# Patient Record
Sex: FEMALE | Race: WHITE | Employment: UNEMPLOYED | URBAN - METROPOLITAN AREA
[De-identification: names, ages, dates, MRNs, and addresses within clinical notes are randomized per-mention and may not be internally consistent; named-entity substitution may affect disease eponyms.]

---

## 2018-08-23 RX ORDER — LISINOPRIL AND HYDROCHLOROTHIAZIDE 12.5; 2 MG/1; MG/1
1 TABLET ORAL DAILY
COMMUNITY

## 2018-08-29 ENCOUNTER — ANESTHESIA (OUTPATIENT)
Dept: SURGERY | Facility: CLINIC | Age: 48
End: 2018-08-29
Payer: COMMERCIAL

## 2018-08-29 ENCOUNTER — HOSPITAL ENCOUNTER (INPATIENT)
Facility: CLINIC | Age: 48
LOS: 2 days | Discharge: HOME | End: 2018-08-31
Attending: ORTHOPAEDIC SURGERY | Admitting: ORTHOPAEDIC SURGERY
Payer: COMMERCIAL

## 2018-08-29 ENCOUNTER — ANESTHESIA EVENT (OUTPATIENT)
Dept: SURGERY | Facility: CLINIC | Age: 48
End: 2018-08-29
Payer: COMMERCIAL

## 2018-08-29 DIAGNOSIS — M86.472 CHRONIC OSTEOMYELITIS OF LEFT FOOT WITH DRAINING SINUS (H): Primary | ICD-10-CM

## 2018-08-29 PROBLEM — M86.9 OSTEOMYELITIS OF LEFT FOOT (H): Status: ACTIVE | Noted: 2018-08-29

## 2018-08-29 LAB
BASOPHILS # BLD AUTO: 0 10E9/L (ref 0–0.2)
BASOPHILS NFR BLD AUTO: 0.3 %
CREAT SERPL-MCNC: 0.51 MG/DL (ref 0.52–1.04)
DIFFERENTIAL METHOD BLD: ABNORMAL
EOSINOPHIL # BLD AUTO: 0 10E9/L (ref 0–0.7)
EOSINOPHIL NFR BLD AUTO: 0.1 %
ERYTHROCYTE [DISTWIDTH] IN BLOOD BY AUTOMATED COUNT: 14 % (ref 10–15)
GFR SERPL CREATININE-BSD FRML MDRD: >90 ML/MIN/1.7M2
GRAM STN SPEC: ABNORMAL
GRAM STN SPEC: NORMAL
GRAM STN SPEC: NORMAL
HCT VFR BLD AUTO: 41.3 % (ref 35–47)
HGB BLD-MCNC: 13.9 G/DL (ref 11.7–15.7)
IMM GRANULOCYTES # BLD: 0 10E9/L (ref 0–0.4)
IMM GRANULOCYTES NFR BLD: 0.4 %
INTERPRETATION ECG - MUSE: NORMAL
LYMPHOCYTES # BLD AUTO: 1 10E9/L (ref 0.8–5.3)
LYMPHOCYTES NFR BLD AUTO: 10 %
Lab: NORMAL
MCH RBC QN AUTO: 30.7 PG (ref 26.5–33)
MCHC RBC AUTO-ENTMCNC: 33.7 G/DL (ref 31.5–36.5)
MCV RBC AUTO: 91 FL (ref 78–100)
MONOCYTES # BLD AUTO: 0.1 10E9/L (ref 0–1.3)
MONOCYTES NFR BLD AUTO: 1 %
NEUTROPHILS # BLD AUTO: 8.6 10E9/L (ref 1.6–8.3)
NEUTROPHILS NFR BLD AUTO: 88.2 %
NRBC # BLD AUTO: 0 10*3/UL
NRBC BLD AUTO-RTO: 0 /100
PLATELET # BLD AUTO: 295 10E9/L (ref 150–450)
RBC # BLD AUTO: 4.53 10E12/L (ref 3.8–5.2)
SPECIMEN SOURCE: ABNORMAL
SPECIMEN SOURCE: NORMAL
WBC # BLD AUTO: 9.8 10E9/L (ref 4–11)

## 2018-08-29 PROCEDURE — 99222 1ST HOSP IP/OBS MODERATE 55: CPT | Performed by: PHYSICIAN ASSISTANT

## 2018-08-29 PROCEDURE — 12000007 ZZH R&B INTERMEDIATE

## 2018-08-29 PROCEDURE — 87070 CULTURE OTHR SPECIMN AEROBIC: CPT | Performed by: ORTHOPAEDIC SURGERY

## 2018-08-29 PROCEDURE — 25000132 ZZH RX MED GY IP 250 OP 250 PS 637: Performed by: ANESTHESIOLOGY

## 2018-08-29 PROCEDURE — 27210794 ZZH OR GENERAL SUPPLY STERILE: Performed by: ORTHOPAEDIC SURGERY

## 2018-08-29 PROCEDURE — 99207 ZZC CONSULT E&M CHANGED TO INITIAL LEVEL: CPT | Performed by: PHYSICIAN ASSISTANT

## 2018-08-29 PROCEDURE — 87077 CULTURE AEROBIC IDENTIFY: CPT | Performed by: ORTHOPAEDIC SURGERY

## 2018-08-29 PROCEDURE — 0JDR0ZZ EXTRACTION OF LEFT FOOT SUBCUTANEOUS TISSUE AND FASCIA, OPEN APPROACH: ICD-10-PCS | Performed by: ORTHOPAEDIC SURGERY

## 2018-08-29 PROCEDURE — 25000125 ZZHC RX 250: Performed by: NURSE ANESTHETIST, CERTIFIED REGISTERED

## 2018-08-29 PROCEDURE — 25000128 H RX IP 250 OP 636: Performed by: ANESTHESIOLOGY

## 2018-08-29 PROCEDURE — 87075 CULTR BACTERIA EXCEPT BLOOD: CPT | Performed by: ORTHOPAEDIC SURGERY

## 2018-08-29 PROCEDURE — 93010 ELECTROCARDIOGRAM REPORT: CPT | Performed by: INTERNAL MEDICINE

## 2018-08-29 PROCEDURE — 25000132 ZZH RX MED GY IP 250 OP 250 PS 637: Performed by: PHYSICIAN ASSISTANT

## 2018-08-29 PROCEDURE — 36000050 ZZH SURGERY LEVEL 2 1ST 30 MIN: Performed by: ORTHOPAEDIC SURGERY

## 2018-08-29 PROCEDURE — 25000128 H RX IP 250 OP 636: Performed by: NURSE ANESTHETIST, CERTIFIED REGISTERED

## 2018-08-29 PROCEDURE — 40000171 ZZH STATISTIC PRE-PROCEDURE ASSESSMENT III: Performed by: ORTHOPAEDIC SURGERY

## 2018-08-29 PROCEDURE — 87076 CULTURE ANAEROBE IDENT EACH: CPT | Performed by: ORTHOPAEDIC SURGERY

## 2018-08-29 PROCEDURE — 71000012 ZZH RECOVERY PHASE 1 LEVEL 1 FIRST HR: Performed by: ORTHOPAEDIC SURGERY

## 2018-08-29 PROCEDURE — 36000052 ZZH SURGERY LEVEL 2 EA 15 ADDTL MIN: Performed by: ORTHOPAEDIC SURGERY

## 2018-08-29 PROCEDURE — 87176 TISSUE HOMOGENIZATION CULTR: CPT | Performed by: ORTHOPAEDIC SURGERY

## 2018-08-29 PROCEDURE — 0QPP04Z REMOVAL OF INTERNAL FIXATION DEVICE FROM LEFT METATARSAL, OPEN APPROACH: ICD-10-PCS | Performed by: ORTHOPAEDIC SURGERY

## 2018-08-29 PROCEDURE — 82565 ASSAY OF CREATININE: CPT | Performed by: ORTHOPAEDIC SURGERY

## 2018-08-29 PROCEDURE — 25000566 ZZH SEVOFLURANE, EA 15 MIN: Performed by: ORTHOPAEDIC SURGERY

## 2018-08-29 PROCEDURE — 87205 SMEAR GRAM STAIN: CPT | Performed by: ORTHOPAEDIC SURGERY

## 2018-08-29 PROCEDURE — C1763 CONN TISS, NON-HUMAN: HCPCS | Performed by: ORTHOPAEDIC SURGERY

## 2018-08-29 PROCEDURE — 36415 COLL VENOUS BLD VENIPUNCTURE: CPT | Performed by: ORTHOPAEDIC SURGERY

## 2018-08-29 PROCEDURE — 25000125 ZZHC RX 250: Performed by: ANESTHESIOLOGY

## 2018-08-29 PROCEDURE — 37000009 ZZH ANESTHESIA TECHNICAL FEE, EACH ADDTL 15 MIN: Performed by: ORTHOPAEDIC SURGERY

## 2018-08-29 PROCEDURE — 3E0V329 INTRODUCTION OF OTHER ANTI-INFECTIVE INTO BONES, PERCUTANEOUS APPROACH: ICD-10-PCS | Performed by: ORTHOPAEDIC SURGERY

## 2018-08-29 PROCEDURE — 0SBN0ZZ EXCISION OF LEFT METATARSAL-PHALANGEAL JOINT, OPEN APPROACH: ICD-10-PCS | Performed by: ORTHOPAEDIC SURGERY

## 2018-08-29 PROCEDURE — 27211024 ZZHC OR SUPPLY OTHER OPNP: Performed by: ORTHOPAEDIC SURGERY

## 2018-08-29 PROCEDURE — 37000008 ZZH ANESTHESIA TECHNICAL FEE, 1ST 30 MIN: Performed by: ORTHOPAEDIC SURGERY

## 2018-08-29 PROCEDURE — 25000128 H RX IP 250 OP 636: Performed by: ORTHOPAEDIC SURGERY

## 2018-08-29 PROCEDURE — 27210995 ZZH RX 272: Performed by: ORTHOPAEDIC SURGERY

## 2018-08-29 PROCEDURE — 85025 COMPLETE CBC W/AUTO DIFF WBC: CPT | Performed by: ORTHOPAEDIC SURGERY

## 2018-08-29 DEVICE — IMPLANTABLE DEVICE: Type: IMPLANTABLE DEVICE | Site: FOOT | Status: FUNCTIONAL

## 2018-08-29 RX ORDER — LIDOCAINE 40 MG/G
CREAM TOPICAL
Status: DISCONTINUED | OUTPATIENT
Start: 2018-08-29 | End: 2018-08-31 | Stop reason: HOSPADM

## 2018-08-29 RX ORDER — AMLODIPINE BESYLATE 10 MG/1
10 TABLET ORAL EVERY EVENING
COMMUNITY

## 2018-08-29 RX ORDER — DEXAMETHASONE SODIUM PHOSPHATE 4 MG/ML
INJECTION, SOLUTION INTRA-ARTICULAR; INTRALESIONAL; INTRAMUSCULAR; INTRAVENOUS; SOFT TISSUE PRN
Status: DISCONTINUED | OUTPATIENT
Start: 2018-08-29 | End: 2018-08-29

## 2018-08-29 RX ORDER — POTASSIUM CHLORIDE 1.5 G/1.58G
20 POWDER, FOR SOLUTION ORAL DAILY
Status: DISCONTINUED | OUTPATIENT
Start: 2018-08-29 | End: 2018-08-29

## 2018-08-29 RX ORDER — MAGNESIUM HYDROXIDE 1200 MG/15ML
LIQUID ORAL PRN
Status: DISCONTINUED | OUTPATIENT
Start: 2018-08-29 | End: 2018-08-29 | Stop reason: HOSPADM

## 2018-08-29 RX ORDER — MEPERIDINE HYDROCHLORIDE 50 MG/ML
12.5 INJECTION INTRAMUSCULAR; INTRAVENOUS; SUBCUTANEOUS
Status: DISCONTINUED | OUTPATIENT
Start: 2018-08-29 | End: 2018-08-29 | Stop reason: HOSPADM

## 2018-08-29 RX ORDER — OXYCODONE HYDROCHLORIDE 5 MG/1
5 TABLET ORAL EVERY 6 HOURS PRN
Status: DISCONTINUED | OUTPATIENT
Start: 2018-08-29 | End: 2018-08-30

## 2018-08-29 RX ORDER — NALOXONE HYDROCHLORIDE 0.4 MG/ML
.1-.4 INJECTION, SOLUTION INTRAMUSCULAR; INTRAVENOUS; SUBCUTANEOUS
Status: DISCONTINUED | OUTPATIENT
Start: 2018-08-29 | End: 2018-08-31 | Stop reason: HOSPADM

## 2018-08-29 RX ORDER — ONDANSETRON 2 MG/ML
4 INJECTION INTRAMUSCULAR; INTRAVENOUS EVERY 30 MIN PRN
Status: DISCONTINUED | OUTPATIENT
Start: 2018-08-29 | End: 2018-08-29 | Stop reason: HOSPADM

## 2018-08-29 RX ORDER — THYROID 90 MG/1
90 TABLET ORAL DAILY
COMMUNITY

## 2018-08-29 RX ORDER — EPHEDRINE SULFATE 50 MG/ML
INJECTION, SOLUTION INTRAVENOUS PRN
Status: DISCONTINUED | OUTPATIENT
Start: 2018-08-29 | End: 2018-08-29

## 2018-08-29 RX ORDER — NICOTINE 21 MG/24HR
1 PATCH, TRANSDERMAL 24 HOURS TRANSDERMAL DAILY
Status: DISCONTINUED | OUTPATIENT
Start: 2018-08-29 | End: 2018-08-31 | Stop reason: HOSPADM

## 2018-08-29 RX ORDER — PROPOFOL 10 MG/ML
INJECTION, EMULSION INTRAVENOUS PRN
Status: DISCONTINUED | OUTPATIENT
Start: 2018-08-29 | End: 2018-08-29

## 2018-08-29 RX ORDER — NALOXONE HYDROCHLORIDE 0.4 MG/ML
.1-.4 INJECTION, SOLUTION INTRAMUSCULAR; INTRAVENOUS; SUBCUTANEOUS
Status: DISCONTINUED | OUTPATIENT
Start: 2018-08-29 | End: 2018-08-29 | Stop reason: HOSPADM

## 2018-08-29 RX ORDER — AMLODIPINE BESYLATE 10 MG/1
10 TABLET ORAL EVERY EVENING
Status: DISCONTINUED | OUTPATIENT
Start: 2018-08-29 | End: 2018-08-31 | Stop reason: HOSPADM

## 2018-08-29 RX ORDER — SODIUM CHLORIDE, SODIUM LACTATE, POTASSIUM CHLORIDE, CALCIUM CHLORIDE 600; 310; 30; 20 MG/100ML; MG/100ML; MG/100ML; MG/100ML
INJECTION, SOLUTION INTRAVENOUS CONTINUOUS
Status: DISCONTINUED | OUTPATIENT
Start: 2018-08-29 | End: 2018-08-29 | Stop reason: HOSPADM

## 2018-08-29 RX ORDER — CEFAZOLIN SODIUM 1 G/50ML
1250 SOLUTION INTRAVENOUS EVERY 12 HOURS
Status: DISCONTINUED | OUTPATIENT
Start: 2018-08-29 | End: 2018-08-30

## 2018-08-29 RX ORDER — CEFAZOLIN SODIUM 1 G/3ML
1 INJECTION, POWDER, FOR SOLUTION INTRAMUSCULAR; INTRAVENOUS SEE ADMIN INSTRUCTIONS
Status: DISCONTINUED | OUTPATIENT
Start: 2018-08-29 | End: 2018-08-29 | Stop reason: HOSPADM

## 2018-08-29 RX ORDER — AMLODIPINE BESYLATE 10 MG/1
10 TABLET ORAL EVERY EVENING
Status: DISCONTINUED | OUTPATIENT
Start: 2018-08-29 | End: 2018-08-29

## 2018-08-29 RX ORDER — SACCHAROMYCES BOULARDII 250 MG
250 CAPSULE ORAL 2 TIMES DAILY
Status: DISCONTINUED | OUTPATIENT
Start: 2018-08-29 | End: 2018-08-31 | Stop reason: HOSPADM

## 2018-08-29 RX ORDER — HYDROMORPHONE HYDROCHLORIDE 1 MG/ML
.3-.5 INJECTION, SOLUTION INTRAMUSCULAR; INTRAVENOUS; SUBCUTANEOUS EVERY 10 MIN PRN
Status: DISCONTINUED | OUTPATIENT
Start: 2018-08-29 | End: 2018-08-29 | Stop reason: HOSPADM

## 2018-08-29 RX ORDER — LORAZEPAM 0.5 MG/1
.5-1 TABLET ORAL EVERY 8 HOURS PRN
Status: DISCONTINUED | OUTPATIENT
Start: 2018-08-29 | End: 2018-08-31 | Stop reason: HOSPADM

## 2018-08-29 RX ORDER — CITALOPRAM HYDROBROMIDE 20 MG/1
40 TABLET ORAL DAILY
Status: DISCONTINUED | OUTPATIENT
Start: 2018-08-29 | End: 2018-08-31 | Stop reason: HOSPADM

## 2018-08-29 RX ORDER — VANCOMYCIN HYDROCHLORIDE 500 MG/10ML
500 INJECTION, POWDER, LYOPHILIZED, FOR SOLUTION INTRAVENOUS ONCE
Status: DISCONTINUED | OUTPATIENT
Start: 2018-08-29 | End: 2018-08-29 | Stop reason: HOSPADM

## 2018-08-29 RX ORDER — CITALOPRAM HYDROBROMIDE 40 MG/1
40 TABLET ORAL DAILY
COMMUNITY

## 2018-08-29 RX ORDER — POTASSIUM CHLORIDE 750 MG/1
10 TABLET, EXTENDED RELEASE ORAL
Status: DISCONTINUED | OUTPATIENT
Start: 2018-08-29 | End: 2018-08-31 | Stop reason: HOSPADM

## 2018-08-29 RX ORDER — POTASSIUM CHLORIDE 600 MG/1
8 TABLET, FILM COATED, EXTENDED RELEASE ORAL
COMMUNITY

## 2018-08-29 RX ORDER — POTASSIUM CHLORIDE 1.5 G/1.58G
20 POWDER, FOR SOLUTION ORAL DAILY
Status: ON HOLD | COMMUNITY
End: 2018-08-29

## 2018-08-29 RX ORDER — LABETALOL HYDROCHLORIDE 5 MG/ML
10 INJECTION, SOLUTION INTRAVENOUS
Status: DISCONTINUED | OUTPATIENT
Start: 2018-08-29 | End: 2018-08-29 | Stop reason: HOSPADM

## 2018-08-29 RX ORDER — THYROID 30 MG/1
90 TABLET ORAL DAILY
Status: DISCONTINUED | OUTPATIENT
Start: 2018-08-29 | End: 2018-08-31 | Stop reason: HOSPADM

## 2018-08-29 RX ORDER — FENTANYL CITRATE 50 UG/ML
25-50 INJECTION, SOLUTION INTRAMUSCULAR; INTRAVENOUS
Status: DISCONTINUED | OUTPATIENT
Start: 2018-08-29 | End: 2018-08-29 | Stop reason: HOSPADM

## 2018-08-29 RX ORDER — FENTANYL CITRATE 50 UG/ML
INJECTION, SOLUTION INTRAMUSCULAR; INTRAVENOUS PRN
Status: DISCONTINUED | OUTPATIENT
Start: 2018-08-29 | End: 2018-08-29

## 2018-08-29 RX ORDER — LISINOPRIL AND HYDROCHLOROTHIAZIDE 12.5; 2 MG/1; MG/1
1 TABLET ORAL DAILY
Status: DISCONTINUED | OUTPATIENT
Start: 2018-08-29 | End: 2018-08-31 | Stop reason: HOSPADM

## 2018-08-29 RX ORDER — HYDROMORPHONE HYDROCHLORIDE 1 MG/ML
.5-1 INJECTION, SOLUTION INTRAMUSCULAR; INTRAVENOUS; SUBCUTANEOUS
Status: DISCONTINUED | OUTPATIENT
Start: 2018-08-29 | End: 2018-08-30

## 2018-08-29 RX ORDER — VANCOMYCIN HYDROCHLORIDE 1 G/200ML
1000 INJECTION, SOLUTION INTRAVENOUS ONCE
Status: COMPLETED | OUTPATIENT
Start: 2018-08-29 | End: 2018-08-29

## 2018-08-29 RX ORDER — CITALOPRAM HYDROBROMIDE 20 MG/1
40 TABLET ORAL DAILY
Status: DISCONTINUED | OUTPATIENT
Start: 2018-08-29 | End: 2018-08-29

## 2018-08-29 RX ORDER — CEFAZOLIN SODIUM 2 G/100ML
2 INJECTION, SOLUTION INTRAVENOUS
Status: COMPLETED | OUTPATIENT
Start: 2018-08-29 | End: 2018-08-29

## 2018-08-29 RX ORDER — ONDANSETRON 4 MG/1
4 TABLET, ORALLY DISINTEGRATING ORAL EVERY 30 MIN PRN
Status: DISCONTINUED | OUTPATIENT
Start: 2018-08-29 | End: 2018-08-29 | Stop reason: HOSPADM

## 2018-08-29 RX ORDER — LIDOCAINE 40 MG/G
CREAM TOPICAL
Status: DISCONTINUED | OUTPATIENT
Start: 2018-08-29 | End: 2018-08-29 | Stop reason: HOSPADM

## 2018-08-29 RX ADMIN — FENTANYL CITRATE 25 MCG: 50 INJECTION, SOLUTION INTRAMUSCULAR; INTRAVENOUS at 08:51

## 2018-08-29 RX ADMIN — POTASSIUM CHLORIDE 10 MEQ: 750 TABLET, FILM COATED, EXTENDED RELEASE ORAL at 18:42

## 2018-08-29 RX ADMIN — CEFAZOLIN SODIUM 2 G: 2 INJECTION, SOLUTION INTRAVENOUS at 07:57

## 2018-08-29 RX ADMIN — PROPOFOL 200 MG: 10 INJECTION, EMULSION INTRAVENOUS at 08:01

## 2018-08-29 RX ADMIN — Medication 0.5 MG: at 16:35

## 2018-08-29 RX ADMIN — EPHEDRINE SULFATE 5 MG: 50 INJECTION, SOLUTION INTRAVENOUS at 08:16

## 2018-08-29 RX ADMIN — LEVOTHYROXINE, LIOTHYRONINE 90 MG: 19; 4.5 TABLET ORAL at 16:46

## 2018-08-29 RX ADMIN — SODIUM CHLORIDE, POTASSIUM CHLORIDE, SODIUM LACTATE AND CALCIUM CHLORIDE: 600; 310; 30; 20 INJECTION, SOLUTION INTRAVENOUS at 11:41

## 2018-08-29 RX ADMIN — HYDROMORPHONE HYDROCHLORIDE 0.5 MG: 1 INJECTION, SOLUTION INTRAMUSCULAR; INTRAVENOUS; SUBCUTANEOUS at 08:38

## 2018-08-29 RX ADMIN — NICOTINE 1 PATCH: 21 PATCH, EXTENDED RELEASE TRANSDERMAL at 10:36

## 2018-08-29 RX ADMIN — Medication 250 MG: at 20:07

## 2018-08-29 RX ADMIN — PHENYLEPHRINE HYDROCHLORIDE 50 MCG: 10 INJECTION, SOLUTION INTRAMUSCULAR; INTRAVENOUS; SUBCUTANEOUS at 08:06

## 2018-08-29 RX ADMIN — LIDOCAINE HYDROCHLORIDE 50 MG: 10 INJECTION, SOLUTION EPIDURAL; INFILTRATION; INTRACAUDAL; PERINEURAL at 08:01

## 2018-08-29 RX ADMIN — VANCOMYCIN HYDROCHLORIDE 1 G: 1 INJECTION, SOLUTION INTRAVENOUS at 08:38

## 2018-08-29 RX ADMIN — EPHEDRINE SULFATE 5 MG: 50 INJECTION, SOLUTION INTRAVENOUS at 08:10

## 2018-08-29 RX ADMIN — VANCOMYCIN HYDROCHLORIDE 1250 MG: 10 INJECTION, POWDER, LYOPHILIZED, FOR SOLUTION INTRAVENOUS at 21:43

## 2018-08-29 RX ADMIN — HYDROMORPHONE HYDROCHLORIDE 0.5 MG: 1 INJECTION, SOLUTION INTRAMUSCULAR; INTRAVENOUS; SUBCUTANEOUS at 08:43

## 2018-08-29 RX ADMIN — FENTANYL CITRATE 25 MCG: 50 INJECTION, SOLUTION INTRAMUSCULAR; INTRAVENOUS at 08:52

## 2018-08-29 RX ADMIN — SODIUM CHLORIDE, POTASSIUM CHLORIDE, SODIUM LACTATE AND CALCIUM CHLORIDE: 600; 310; 30; 20 INJECTION, SOLUTION INTRAVENOUS at 07:05

## 2018-08-29 RX ADMIN — FENTANYL CITRATE 100 MCG: 50 INJECTION, SOLUTION INTRAMUSCULAR; INTRAVENOUS at 08:01

## 2018-08-29 RX ADMIN — DEXAMETHASONE SODIUM PHOSPHATE 4 MG: 4 INJECTION, SOLUTION INTRA-ARTICULAR; INTRALESIONAL; INTRAMUSCULAR; INTRAVENOUS; SOFT TISSUE at 08:02

## 2018-08-29 RX ADMIN — CITALOPRAM HYDROBROMIDE 40 MG: 20 TABLET ORAL at 16:35

## 2018-08-29 ASSESSMENT — ACTIVITIES OF DAILY LIVING (ADL)
ADLS_ACUITY_SCORE: 15
ADLS_ACUITY_SCORE: 15

## 2018-08-29 ASSESSMENT — LIFESTYLE VARIABLES: TOBACCO_USE: 1

## 2018-08-29 NOTE — IP AVS SNAPSHOT
MRN:[id number]                      After Visit Summary   8/29/2018    Genoveva Harrell    MRN: [id number]           Thank you!     Thank you for choosing [facility] for your care. Our goal is always to provide you with excellent care. Hearing back from our patients is one way we can continue to improve our services. Please take a few minutes to complete the written survey that you may receive in the mail after you visit. If you would like to speak to someone directly about your visit please contact Patient Relations at [phone]. Thank you!          Patient Information     Date Of Birth          1970        About your hospital stay     You were admitted on:  August 29, 2018 You last received care in the:  [facility]    You were discharged on:  August 31, 2018        Reason for your hospital stay       Infection on hardware identified during surgery  Surgery aborted                  Who to Call     For medical emergencies, please call 911.  For non-urgent questions about your medical care, please call your primary care provider or clinic, [phone]  For questions related to your surgery, please call your surgery clinic        Attending Provider     Provider Specialty    Shahab Rodrigues MD Orthopedics       Primary Care Provider Office Phone # Fax #    [facility] [phone]      After Care Instructions     Diet       Follow this diet upon discharge: Orders Placed This Encounter      Regular Diet Adult                  Follow-up Appointments     Follow-up and recommended labs and tests        Follow up with me,  Shahab Rodrigues, within 2-3 weeks. to evaluate after surgery.  No follow up labs or test are needed.  Plan surgery for after IV abx discontinued                  Further instructions from your care team       Post Operative Instructions for DR. SHAHAB RODRIGUES M.D.  [facility]    DIET and  NUTRITIONAL SUPPLEMENTATION:  1. Begin with liquids and light foods (jello, soups, etc).  Rehydration is important the first few days after surgery.  Gradually resume a normal diet; avoiding foods with a label.   2. To aid in both surgical wound and bone healing here are several recommendations:  ?    Eat 3 or more servings per day of foods high in Protein and Calories.              ? Examples include: fish, poultry, meat, pork, nuts, and dairy products  ?    Take Calcium 1500 mg per day from a combination of dairy products and Calcium supplements  ?    Take Vitamin C 500 mg 3 times per day (total of 1500 mg) or eat 5 servings of citrus fruits   ?    Take Zinc 50 mg 2 times per day (total of 100 mg)  ?    Take Betacarotene 25,000 IU each day  3. It is best not to smoke, use tobacco, or nicotine patches during your healing period.    FOR 24 HOURS FOLLOWING SURGERY:  1. Be in the care of a responsible adult.  2. Do not drive or operate any machinery.  3. Do not make important personal or business decisions or sign legal documents.  4. Avoid alcoholic beverages.    MEDICATIONS:  1. Strong oral pain medication has been prescribed for the first few days. Use only as directed.  2. Do not drink alcoholic beverages while using this medication.  3. When taking pain medication, be careful as you walk or climb stairs. Mild dizziness is not unusual.  4. Continue to take your routine medications as prescribed by your internist/family practitioner. Please ask if you are unsure about continuing these medications after surgery.  5. Do not take any medications that have not been prescribed by your physician.    ACTIVITIES:  1. While seated or lying, keep the operated limb at the level of your heart, toes at the level of your nose, for 23 hours per day for three full days after surgery.  This is the time when the most swelling occurs while the wound attempts to seal.   2. Over the next 10 days if the limb begins to throb, you may  have been up for too long or been trying to do too much. Take a break and elevate your leg.   3. Place ice packs over the operative site on the cast or dressing.  Use for 20 minutes every 2-3 hours while you are awake-it will help alleviate pain and swelling.  4. Return to work:  timing depends on your type of employment.    WOUND CARE:   1. Unless you have been instructed by Dr. Rodrigues, do not remove your cast or dressings.  2. To help keep your dressing clean and dry use a waterproof cast sock (Dry-pro)                  [email or web address] or a plastic bag and duct tape for bathing.  3. Do not place foreign objects into your cast or dressing even if you are itchy-they can get stuck and cause ulcers and infections.  Use a hairdryer if skin becomes moist from sweat.    WHEN TO CALL YOUR PHYSICIAN:  1. While a low-grade fever in the first 24-48 post-operative hours is not unusual, a temperature above 101.50F, an elevation in temperature more than 3 days after surgery, or associated with chills, merits evaluation.   2. Increasing or a change in type of pain, swelling, numbness, pain with constant toe stretching.  3. Continuous drainage or bleeding from the incision site. A small amount is expected.  4. Any other worrisome condition.    FOLLOW-UP CARE:  If you have not scheduled a follow-up appointment, please call my  at [phone] or email her at   [email or web address] or through our patient portal.  To improve your overall experience routine care dressing or cast changes may be scheduled with an orthopedic assistant.      Follow up appointment_______________________________________________________________________    Nurse signature____________________________________________Date________________Time_________    Patient signature___________________________________________Dare________________Time_________      Additional information: IF taking pain medications  You may have been prescribed a  multi-modal pain medication regimen.  Inform your physician of any drug allergies.  Longer-Acting Narcotic (example MS contin)                 Directions:  take 1 tablet by mouth every 12 hours only if needed for pain.  Long-Acting Anti-Inflammatory (example ibuprofen, motrin)                 Directions:  take 1 tablet by mouth every 8 hours for the next five days and then only if needed for pain.  Short-Acting Narcotic (example oxycodone immediate release, dilaudid)                 Directions:  take prescribed amount by mouth every 3 hours as needed for breakthrough pain.    What is breakthrough pain?  Breakthrough pain is pain that is NOT controlled by the longer-acting pain pill.    Directions for Use:  1. Start taking the longer-acting pain medication at bedtime on the day of your surgery; regardless of if you   have pain.  Since this medication is taken every 12 hours, you don t want to wait until the middle of the night to start it, or you will continue to have to take it in the middle of the night to stay on schedule.      2. DO NOT CRUSH, CHEW OR DISSOLVE THE TABLET.    3. If you have uncontrolled pain (breakthrough) during the 12 hours, take the short acting pain pill as prescribed.    4. Some patients need to take both the longer acting and short acting pain pills for the first few days to control their pain.    5. ALWAYS take with food (soup, jello, fruit or nuts).    6. Pain pills can cause constipation.  Start taking the Miralax one packet or capful daily the morning after surgery.  Continue until you stop using narcotics.    7. If the pain pill causes nausea, take the medication for nausea (if prescribed).  Wait for one hour after taking the anti-nausea medication before you resume your pain pills.    8. If you have itching, take over-the-counter diphenhydramine (Benadryl) as directed on the label.  If you have a rash or hives, do not resume your pain medication until an allergic reaction is ruled out.   Call physician for further instructions.    9. STOP TAKING THE MEDICATIONS AND CALL YOUR DOCTOR IF:       ?  You have uncontrolled nausea and or vomiting     ?  You have a rash or hives     ?   IF YOU HAVE TROUBLE BREATHING OR SWALLOWING, CALL 911     [facility] will  Be in touch with you 9/1/18 in am regarding your iv antibiotic therapy.  Printed instructions were given to pt on care and observation of picc line.        GENERAL ANESTHESIA OR SEDATION ADULT DISCHARGE INSTRUCTIONS   SPECIAL PRECAUTIONS FOR 24 HOURS AFTER SURGERY    IT IS NOT UNUSUAL TO FEEL LIGHT-HEADED OR FAINT, UP TO 24 HOURS AFTER SURGERY OR WHILE TAKING PAIN MEDICATION.  IF YOU HAVE THESE SYMPTOMS; SIT FOR A FEW MINUTES BEFORE STANDING AND HAVE SOMEONE ASSIST YOU WHEN YOU GET UP TO WALK OR USE THE BATHROOM.    YOU SHOULD REST AND RELAX FOR THE NEXT 24 HOURS AND YOU MUST MAKE ARRANGEMENTS TO HAVE SOMEONE STAY WITH YOU FOR AT LEAST 24 HOURS AFTER YOUR DISCHARGE.  AVOID HAZARDOUS AND STRENUOUS ACTIVITIES.  DO NOT MAKE IMPORTANT DECISIONS FOR 24 HOURS.    DO NOT DRIVE ANY VEHICLE OR OPERATE MECHANICAL EQUIPMENT FOR 24 HOURS FOLLOWING THE END OF YOUR SURGERY.  EVEN THOUGH YOU MAY FEEL NORMAL, YOUR REACTIONS MAY BE AFFECTED BY THE MEDICATION YOU HAVE RECEIVED.    DO NOT DRINK ALCOHOLIC BEVERAGES FOR 24 HOURS FOLLOWING YOUR SURGERY.    DRINK CLEAR LIQUIDS (APPLE JUICE, GINGER ALE, 7-UP, BROTH, ETC.).  PROGRESS TO YOUR REGULAR DIET AS YOU FEEL ABLE.    YOU MAY HAVE A DRY MOUTH, A SORE THROAT, MUSCLES ACHES OR TROUBLE SLEEPING.  THESE SHOULD GO AWAY AFTER 24 HOURS.    CALL YOUR DOCTOR FOR ANY OF THE FOLLOWING:  SIGNS OF INFECTION (FEVER, GROWING TENDERNESS AT THE SURGERY SITE, A LARGE AMOUNT OF DRAINAGE OR BLEEDING, SEVERE PAIN, FOUL-SMELLING DRAINAGE, REDNESS OR SWELLING.    IT HAS BEEN OVER 8 TO 10 HOURS SINCE SURGERY AND YOU ARE STILL NOT ABLE TO URINATE (PASS WATER).     Pending Results     Date and Time Order Name Status Description     "2018 0835 Tissue Culture Aerobic Bacterial Preliminary     2018 0835 Anaerobic bacterial culture Preliminary     2018 0835 Anaerobic bacterial culture Preliminary             Statement of Approval     Ordered          18 1545  I have reviewed and agree with all the recommendations and orders detailed in this document.     Approved and electronically signed by:  Scott Ward MD           18 8948  I have reviewed and agree with all the recommendations and orders detailed in this document.  EFFECTIVE NOW     Approved and electronically signed by:  Arie Rodrigues MD             Admission Information     Date & Time Provider Department Dept. Phone    2018 Arie Rodrigues MD [facility] [phone]      Your Vitals Were     Blood Pressure Pulse Temperature Respirations Height Weight    142/73 (BP Location: Left arm) 65 98.1  F (36.7  C) (Oral) 16 1.676 m (5' 6\") 76.2 kg (168 lb)    Pulse Oximetry BMI (Body Mass Index)                98% 27.12 kg/m2          MyChart Information     [employer] lets you send messages to your doctor, view your test results, renew your prescriptions, schedule appointments and more. To sign up, go to [email or web address] . Click on \"Log in\" on the left side of the screen, which will take you to the Welcome page. Then click on \"Sign up Now\" on the right side of the page.     You will be asked to enter the access code listed below, as well as some personal information. Please follow the directions to create your username and password.     Your access code is: [email or web address]  Expires: 2018  9:16 AM     Your access code will  in 90 days. If you need help or a new code, please call your [place] clinic or [phone].        Care EveryWhere ID     This is your Care EveryWhere ID. This could be used by other organizations to access your [place] medical records  [id number]        Equal Access to Services     [facility] " GAAR : Hadii aad ku hadcarine Soshaiali, waaxda luqadaha, qaybta kaalmada adeegyada, sheri mirianin hayaan adehollie renee ervin . So [facility] [phone].    ATENCIÓN: Si habla español, tiene a caballero disposición servicios gratuitos de asistencia lingüística. Llame al [phone].    We comply with applicable federal civil rights laws and [place] laws. We do not discriminate on the basis of race, color, national origin, age, disability, sex, sexual orientation, or gender identity.               Review of your medicines      START taking        Dose / Directions    order for DME        Equipment being ordered: Crutches () Treatment Diagnosis: gait instability   Quantity:  1 each   Refills:  0       oxyCODONE IR 5 MG tablet   Commonly known as:  ROXICODONE        Dose:  5-10 mg   Take 1-2 tablets (5-10 mg) by mouth every 3 hours as needed for moderate to severe pain   Quantity:  15 tablet   Refills:  0       vancomycin 100 mg/mL injection   Commonly known as:  VANCOCIN        Dose:  1000 mg   Inject 1 g (1,000 mg) into the vein every 12 hours for 14 days CRP,CBC with differential, creatinine, vancomycin trough, ESR twice weekly while on this medication to be faxed to Dr. Ward office at [phone].   Quantity:  600 mL   Refills:  0         CONTINUE these medicines which have NOT CHANGED        Dose / Directions    amLODIPine 10 MG tablet   Commonly known as:  NORVASC        Dose:  10 mg   Take 10 mg by mouth every evening   Refills:  0       citalopram 40 MG tablet   Commonly known as:  celeXA        Dose:  40 mg   Take 40 mg by mouth daily   Refills:  0       lisinopril-hydrochlorothiazide 20-12.5 MG per tablet   Commonly known as:  PRINZIDE/ZESTORETIC        Dose:  1 tablet   Take 1 tablet by mouth daily   Refills:  0       NAPROXEN PO        Dose:  500 mg   Take 500 mg by mouth 2 times daily as needed for moderate pain   Refills:  0       potassium chloride 8 MEQ CR tablet   Commonly known as:  KLOR-CON         Dose:  8 mEq   Take 8 mEq by mouth three times a week On Mondays, Wednesdays, Fridays   Refills:  0       Thyroid 90 MG Tabs        Dose:  90 mg   Take 90 mg by mouth daily   Refills:  0       VITAMIN D (CHOLECALCIFEROL) PO        Dose:  2000 Units   Take 2,000 Units by mouth daily   Refills:  0            Where to get your medicines      Some of these will need a paper prescription and others can be bought over the counter. Ask your nurse if you have questions.     Bring a paper prescription for each of these medications     order for DME    oxyCODONE IR 5 MG tablet         Information about where to get these medications is not yet available     ! Ask your nurse or doctor about these medications     vancomycin 100 mg/mL injection                Protect others around you: Learn how to safely use, store and throw away your medicines at www.disposemymeds.org.        ANTIBIOTIC INSTRUCTION     You've Been Prescribed an Antibiotic - Now What?  Your healthcare team thinks that you or your loved one might have an infection. Some infections can be treated with antibiotics, which are powerful, life-saving drugs. Like all medications, antibiotics have side effects and should only be used when necessary. There are some important things you should know about your antibiotic treatment.      Your healthcare team may run tests before you start taking an antibiotic.    Your team may take samples (e.g., from your blood, urine or other areas) to run tests to look for bacteria. These test can be important to determine if you need an antibiotic at all and, if you do, which antibiotic will work best.      Within a few days, your healthcare team might change or even stop your antibiotic.    Your team may start you on an antibiotic while they are working to find out what is making you sick.    Your team might change your antibiotic because test results show that a different antibiotic would be better to treat your infection.    In some  cases, once your team has more information, they learn that you do not need an antibiotic at all. They may find out that you don't have an infection, or that the antibiotic you're taking won't work against your infection. For example, an infection caused by a virus can't be treated with antibiotics. Staying on an antibiotic when you don't need it is more likely to be harmful than helpful.      You may experience side effects from your antibiotic.    Like all medications, antibiotics have side effects. Some of these can be serious.    Let you healthcare team know if you have any known allergies when you are admitted to the hospital.    One significant side effect of nearly all antibiotics is the risk of severe and sometimes deadly diarrhea caused by Clostridium difficile (C. Difficile). This occurs when a person takes antibiotics because some good germs are destroyed. Antibiotic use allows C. diificile to take over, putting patients at high risk for this serious infection.    As a patient or caregiver, it is important to understand your or your loved one's antibiotic treatment. It is especially important for caregivers to speak up when patients can't speak for themselves. Here are some important questions to ask your healthcare team.    What infection is this antibiotic treating and how do you know I have that infection?    What side effects might occur from this antibiotic?    How long will I need to take this antibiotic?    Is it safe to take this antibiotic with other medications or supplements (e.g., vitamins) that I am taking?     Are there any special directions I need to know about taking this antibiotic? For example, should I take it with food?    How will I be monitored to know whether my infection is responding to the antibiotic?    What tests may help to make sure the right antibiotic is prescribed for me?      Information provided by:  www.cdc.gov/getsmart  U.S. Department of Health and Human  Services  Centers for disease Control and Prevention  National Center for Emerging and Zoonotic Infectious Diseases  Division of Healthcare Quality Promotion        Information about OPIOIDS     PRESCRIPTION OPIOIDS: WHAT YOU NEED TO KNOW   We gave you an opioid (narcotic) pain medicine. It is important to manage your pain, but opioids are not always the best choice. You should first try all the other options your care team gave you. Take this medicine for as short a time (and as few doses) as possible.    Some activities can increase your pain, such as bandage changes or therapy sessions. It may help to take your pain medicine 30 to 60 minutes before these activities. Reduce your stress by getting enough sleep, working on hobbies you enjoy and practicing relaxation or meditation. Talk to your care team about ways to manage your pain beyond prescription opioids.    These medicines have risks:    DO NOT drive when on new or higher doses of pain medicine. These medicines can affect your alertness and reaction times, and you could be arrested for driving under the influence (DUI). If you need to use opioids long-term, talk to your care team about driving.    DO NOT operate heavy machinery    DO NOT do any other dangerous activities while taking these medicines.    DO NOT drink any alcohol while taking these medicines.     If the opioid prescribed includes acetaminophen, DO NOT take with any other medicines that contain acetaminophen. Read all labels carefully. Look for the word  acetaminophen  or  Tylenol.  Ask your pharmacist if you have questions or are unsure.    You can get addicted to pain medicines, especially if you have a history of addiction (chemical, alcohol or substance dependence). Talk to your care team about ways to reduce this risk.    All opioids tend to cause constipation. Drink plenty of water and eat foods that have a lot of fiber, such as fruits, vegetables, prune juice, apple juice and high-fiber  cereal. Take a laxative (Miralax, milk of magnesia, Colace, Senna) if you don t move your bowels at least every other day. Other side effects include upset stomach, sleepiness, dizziness, throwing up, tolerance (needing more of the medicine to have the same effect), physical dependence and slowed breathing.    Store your pills in a secure place, locked if possible. We will not replace any lost or stolen medicine. If you don t finish your medicine, please throw away (dispose) as directed by your pharmacist. The [place] Pollution Control Agency has more information about safe disposal: https://www.pca.[facility].[place].us/living-green/managing-unwanted-medications             Medication List: This is a list of all your medications and when to take them. Check marks below indicate your daily home schedule. Keep this list as a reference.      Medications           Morning Afternoon Evening Bedtime As Needed    amLODIPine 10 MG tablet   Commonly known as:  NORVASC   Take 10 mg by mouth every evening                                citalopram 40 MG tablet   Commonly known as:  celeXA   Take 40 mg by mouth daily   Last time this was given:  40 mg on 8/31/2018  9:20 AM                                lisinopril-hydrochlorothiazide 20-12.5 MG per tablet   Commonly known as:  PRINZIDE/ZESTORETIC   Take 1 tablet by mouth daily                                NAPROXEN PO   Take 500 mg by mouth 2 times daily as needed for moderate pain                                order for DME   Equipment being ordered: [place] () Treatment Diagnosis: gait instability                                oxyCODONE IR 5 MG tablet   Commonly known as:  ROXICODONE   Take 1-2 tablets (5-10 mg) by mouth every 3 hours as needed for moderate to severe pain   Last time this was given:  10 mg on 8/31/2018  3:59 PM                                potassium chloride 8 MEQ CR tablet   Commonly known as:  KLOR-CON   Take 8 mEq by mouth three times a week On Mondays,  Wednesdays, Fridays   Last time this was given:  10 mEq on 8/31/2018  9:33 AM                                Thyroid 90 MG Tabs   Take 90 mg by mouth daily   Last time this was given:  90 mg on 8/31/2018  9:20 AM                                vancomycin 100 mg/mL injection   Commonly known as:  VANCOCIN   Inject 1 g (1,000 mg) into the vein every 12 hours for 14 days CRP,CBC with differential, creatinine, vancomycin trough, ESR twice weekly while on this medication to be faxed to Dr. Ward office at [phone].   Last time this was given:  1,500 mg on 8/31/2018 12:51 PM                                VITAMIN D (CHOLECALCIFEROL) PO   Take 2,000 Units by mouth daily

## 2018-08-29 NOTE — PLAN OF CARE
Problem: Patient Care Overview  Goal: Plan of Care/Patient Progress Review  Pt arrived to the floor at 14:45. Pt is alert and oriented.  Pt is due to void .  Pt is unable to wiggle toes . Pt denies pain . Pt was settled and post op vitals started.

## 2018-08-29 NOTE — BRIEF OP NOTE
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; ulcer fifth MT head   Procedure: Procedure(s):  Left Foot Incision and Drainage fifth Metatarsal head, Superficial Debridement Ulcer left foot, Incision and drainage Left Great Toe, Removal of Infected Hardwar and application of Stimulan and antibiotic   - Wound Class: I-Clean   - Wound Class: I-Clean   Surgeon(s): Surgeon(s) and Role:     * Arie Rodrigues MD - Primary   Estimated blood loss: 5 mL    Specimens:   ID Type Source Tests Collected by Time Destination   1 : Left foot wound cultures Wound Foot, Left ANAEROBIC BACTERIAL CULTURE, GRAM STAIN, WOUND CULTURE AEROBIC BACTERIAL Arie Rodrigues MD 8/29/2018  8:31 AM    2 : Left foot Tissue and Hardware for Cultures Tissue Foot, Left ANAEROBIC BACTERIAL CULTURE, GRAM STAIN, TISSUE CULTURE AEROBIC BACTERIAL Arie Rodrigues MD 8/29/2018  8:33 AM       Findings:  infection on screw

## 2018-08-29 NOTE — IP AVS SNAPSHOT
[facility]    [address] [facility] [address]    Phone:  [phone]    Fax:  [phone]                                       After Visit Summary   8/29/2018    Genoveva Harrell    MRN: [id number]           After Visit Summary Signature Page     I have received my discharge instructions, and my questions have been answered. I have discussed any challenges I see with this plan with the nurse or doctor.    ..........................................................................................................................................  Patient/Patient Representative Signature      ..........................................................................................................................................  Patient Representative Print Name and Relationship to Patient    ..................................................               ................................................  Date                                            Time    ..........................................................................................................................................  Reviewed by Signature/Title    ...................................................              ..............................................  Date                                                            Time          22EPIC Rev 08/18

## 2018-08-29 NOTE — ANESTHESIA PROCEDURE NOTES
Peripheral nerve/Neuraxial procedure note : Sciatic (via popliteal approach)  Pre-Procedure  Performed by JOSE HELTON  Referred by SHAUN  Location: pre-op    Procedure Times:8/29/2018 7:04 AM and 8/29/2018 7:14 AM  Pre-Anesthestic Checklist: patient identified, IV checked, site marked, risks and benefits discussed, informed consent, monitors and equipment checked, pre-op evaluation, at physician/surgeon's request and post-op pain management    Timeout  Correct Patient: Yes   Correct Procedure: Yes   Correct Site: Yes   Correct Laterality: Yes   Correct Position: Yes   Site Marked: Yes   .   Procedure Documentation    .    Procedure:  left  Sciatic (via popliteal approach).     Ultrasound used to identify targeted nerve, plexus, or vascular marker and placed a needle adjacent to it., Ultrasound was used to visualize the spread of the anesthetic in close proximity to the above stated nerve.   Patient Prep;mask, sterile gloves, povidone-iodine 7.5% surgical scrub.  Nerve Stim: Initial Level 0.44 mA. Lowest motor response mA..  Needle: insulated Needle Gauge: 22.    Needle Length (Inches) 2  Insertion Method: Single Shot.       Assessment/Narrative  Paresthesias: No.  Injection made incrementally with aspirations every 5 mL..  The placement was negative for: blood aspirated, painful injection and site bleeding.  Bolus given via needle..   Secured via.   Complications: none. Test dose of mL at. Test dose negative for signs of intravascular, subdural or intrathecal injection. Comments:  The surgeon has given a verbal order transferring care of this patient to me for the performance of a regional analgesia block for post-op pain control. It is requested of me because I am uniquely trained and qualified to perform this block and the surgeon is neither trained nor qualified to perform this procedure.    30 ml 0.5% bupivacaine with 1:200k epi

## 2018-08-29 NOTE — ANESTHESIA POSTPROCEDURE EVALUATION
Patient: Genoveva Harrell    Procedure(s):  Left Foot Incision and Drainage fifth Metatarsal head, Superficial Debridement Ulcer left foot, Incision and drainage Left Great Toe, Removal of Infected Hardwar and application of Stimulan and antibiotic   - Wound Class: I-Clean   - Wound Class: I-Clean    Diagnosis:painful hardware  Diagnosis Additional Information: PREOPERATIVE DIAGNOSES:     1.  Left foot multiple malunions.   2.  Identified in preop new onset of drainage from medial foot wound.   3.  Identified in preop chronic ulceration, fifth metatarsal head.       POSTOPERATIVE DIAGNOSES:     1.  Infectio, n over hardware, left first ray, first MTP joint.     2.  Ulceration left fifth metatarsal head.       SURGICAL PROCEDURE:   1.  Left foot removal of hardware.   2.  Sharp excisional debridement through skin and subcutaneous tissues down to bone on t, he first MTP joint with application of stimulant absorbable antibiotic vancomycin impregnated beads.   3.  Debridement of callus through skin and subcutaneous tissues fifth metatarsal head region plantarly.     Anesthesia Type:  General, Peripheral Nerve Block for post-op pain at surgeon's request    Note:  Anesthesia Post Evaluation    Patient location during evaluation: PACU  Patient participation: Able to fully participate in evaluation  Level of consciousness: awake and alert  Pain management: adequate  Airway patency: patent  Cardiovascular status: acceptable  Respiratory status: acceptable  Hydration status: acceptable  PONV: none     Anesthetic complications: None          Last vitals:  Vitals:    08/29/18 1440 08/29/18 1514 08/29/18 1547   BP: 115/80 106/71 120/70   Pulse:      Resp: 20 13 28   Temp:   99  F (37.2  C)   SpO2: 96% 96% 96%         Electronically Signed By: Bright Bueno MD  August 29, 2018  3:54 PM

## 2018-08-29 NOTE — ANESTHESIA PREPROCEDURE EVALUATION
Anesthesia Evaluation     . Pt has had prior anesthetic. Type: General    No history of anesthetic complications          ROS/MED HX    ENT/Pulmonary:     (+)tobacco use, Current use , . .    Neurologic:  - neg neurologic ROS     Cardiovascular:     (+) hypertension----. : . . . :. .       METS/Exercise Tolerance:     Hematologic:  - neg hematologic  ROS       Musculoskeletal:  - neg musculoskeletal ROS       GI/Hepatic:  - neg GI/hepatic ROS       Renal/Genitourinary:  - ROS Renal section negative       Endo:     (+) thyroid problem hypothyroidism, .      Psychiatric:  - neg psychiatric ROS       Infectious Disease:  - neg infectious disease ROS       Malignancy:         Other:    - neg other ROS                 Physical Exam  Normal systems: cardiovascular and pulmonary    Airway   Mallampati: II  TM distance: >3 FB  Neck ROM: full    Dental   (+) missing and upper dentures    Cardiovascular       Pulmonary                     Anesthesia Plan      History & Physical Review  History and physical reviewed and following examination; no interval change.    ASA Status:  3 .    NPO Status:  > 8 hours    Plan for General and Periph. Nerve Block for postop pain with Intravenous induction. Maintenance will be Balanced.    PONV prophylaxis:  Ondansetron (or other 5HT-3) and Dexamethasone or Solumedrol       Postoperative Care  Postoperative pain management:  IV analgesics and Oral pain medications.      Consents  Anesthetic plan, risks, benefits and alternatives discussed with:  Patient..                          .

## 2018-08-29 NOTE — IP AVS SNAPSHOT
"[facility]: [phone]                                              INTERAGENCY TRANSFER FORM - PHYSICIAN ORDERS   2018                    Hospital Admission Date: 2018  HEATHER QUINTANILLA   : 1970  Sex: Female        Attending Provider: Arie Rodrigues MD     Allergies:  No Known Allergies    Infection:  None   Service:  SURGERY    Ht:  1.676 m (5' 6\")   Wt:  76.2 kg (168 lb)   Admission Wt:  76.2 kg (168 lb)    BMI:  27.12 kg/m 2   BSA:  1.88 m 2            Patient PCP Information     Provider PCP Type    [facility]      ED Clinical Impression     Diagnosis Description Comment Added By Time Added    Chronic osteomyelitis of left foot with draining sinus (H) [M86.472] Chronic osteomyelitis of left foot with draining sinus (H) [M86.472]  Arie Rodrigues MD 2018  5:30 PM      Hospital Problems as of 2018              Priority Class Noted POA    Osteomyelitis of left foot (H) Medium  2018 Yes      Non-Hospital Problems as of 2018     None      Code Status History     Date Active Date Inactive Code Status Order ID Comments User Context    This patient has a current code status but no historical code status.         Medication Review      START taking        Dose / Directions Comments    order for DME        Equipment being ordered: Crutches () Treatment Diagnosis: gait instability   Quantity:  1 each   Refills:  0        oxyCODONE IR 5 MG tablet   Commonly known as:  ROXICODONE        Dose:  5-10 mg   Take 1-2 tablets (5-10 mg) by mouth every 3 hours as needed for moderate to severe pain   Quantity:  15 tablet   Refills:  0        vancomycin 100 mg/mL injection   Commonly known as:  VANCOCIN        Dose:  1000 mg   Inject 1 g (1,000 mg) into the vein every 12 hours for 14 days CRP,CBC with differential, creatinine, vancomycin trough, ESR twice weekly while on this medication to be faxed to Dr. Ward office at " [phone].   Quantity:  600 mL   Refills:  0          CONTINUE these medications which have NOT CHANGED        Dose / Directions Comments    amLODIPine 10 MG tablet   Commonly known as:  NORVASC        Dose:  10 mg   Take 10 mg by mouth every evening   Refills:  0        citalopram 40 MG tablet   Commonly known as:  celeXA        Dose:  40 mg   Take 40 mg by mouth daily   Refills:  0        lisinopril-hydrochlorothiazide 20-12.5 MG per tablet   Commonly known as:  PRINZIDE/ZESTORETIC        Dose:  1 tablet   Take 1 tablet by mouth daily   Refills:  0        NAPROXEN PO        Dose:  500 mg   Take 500 mg by mouth 2 times daily as needed for moderate pain   Refills:  0        potassium chloride 8 MEQ CR tablet   Commonly known as:  KLOR-CON        Dose:  8 mEq   Take 8 mEq by mouth three times a week On Mondays, Wednesdays, Fridays   Refills:  0        Thyroid 90 MG Tabs        Dose:  90 mg   Take 90 mg by mouth daily   Refills:  0        VITAMIN D (CHOLECALCIFEROL) PO        Dose:  2000 Units   Take 2,000 Units by mouth daily   Refills:  0                  Further instructions from your care team       Post Operative Instructions for DR. SHAHAB DUNN M.D.  [facility]    DIET and NUTRITIONAL SUPPLEMENTATION:  1. Begin with liquids and light foods (jello, soups, etc).  Rehydration is important the first few days after surgery.  Gradually resume a normal diet; avoiding foods with a label.   2. To aid in both surgical wound and bone healing here are several recommendations:  ?    Eat 3 or more servings per day of foods high in Protein and Calories.              ? Examples include: fish, poultry, meat, pork, nuts, and dairy products  ?    Take Calcium 1500 mg per day from a combination of dairy products and Calcium supplements  ?    Take Vitamin C 500 mg 3 times per day (total of 1500 mg) or eat 5 servings of citrus fruits   ?    Take Zinc 50 mg 2 times per day (total of 100 mg)  ?    Take Betacarotene  25,000 IU each day  3. It is best not to smoke, use tobacco, or nicotine patches during your healing period.    FOR 24 HOURS FOLLOWING SURGERY:  1. Be in the care of a responsible adult.  2. Do not drive or operate any machinery.  3. Do not make important personal or business decisions or sign legal documents.  4. Avoid alcoholic beverages.    MEDICATIONS:  1. Strong oral pain medication has been prescribed for the first few days. Use only as directed.  2. Do not drink alcoholic beverages while using this medication.  3. When taking pain medication, be careful as you walk or climb stairs. Mild dizziness is not unusual.  4. Continue to take your routine medications as prescribed by your internist/family practitioner. Please ask if you are unsure about continuing these medications after surgery.  5. Do not take any medications that have not been prescribed by your physician.    ACTIVITIES:  1. While seated or lying, keep the operated limb at the level of your heart, toes at the level of your nose, for 23 hours per day for three full days after surgery.  This is the time when the most swelling occurs while the wound attempts to seal.   2. Over the next 10 days if the limb begins to throb, you may have been up for too long or been trying to do too much. Take a break and elevate your leg.   3. Place ice packs over the operative site on the cast or dressing.  Use for 20 minutes every 2-3 hours while you are awake-it will help alleviate pain and swelling.  4. Return to work:  timing depends on your type of employment.    WOUND CARE:   1. Unless you have been instructed by Dr. Rodrigues, do not remove your cast or dressings.  2. To help keep your dressing clean and dry use a waterproof cast sock (Dry-pro)                  www.[employer] or a plastic bag and duct tape for bathing.  3. Do not place foreign objects into your cast or dressing even if you are itchy-they can get stuck and cause ulcers and infections.  Use  a hairdryer if skin becomes moist from sweat.    WHEN TO CALL YOUR PHYSICIAN:  1. While a low-grade fever in the first 24-48 post-operative hours is not unusual, a temperature above 101.50F, an elevation in temperature more than 3 days after surgery, or associated with chills, merits evaluation.   2. Increasing or a change in type of pain, swelling, numbness, pain with constant toe stretching.  3. Continuous drainage or bleeding from the incision site. A small amount is expected.  4. Any other worrisome condition.    FOLLOW-UP CARE:  If you have not scheduled a follow-up appointment, please call my  at [phone] or email her at   [email or web address] or through our patient portal.  To improve your overall experience routine care dressing or cast changes may be scheduled with an orthopedic assistant.      Follow up appointment_______________________________________________________________________    Nurse signature____________________________________________Date________________Time_________    Patient signature___________________________________________Dare________________Time_________      Additional information: IF taking pain medications  You may have been prescribed a multi-modal pain medication regimen.  Inform your physician of any drug allergies.  Longer-Acting Narcotic (example MS contin)                 Directions:  take 1 tablet by mouth every 12 hours only if needed for pain.  Long-Acting Anti-Inflammatory (example ibuprofen, motrin)                 Directions:  take 1 tablet by mouth every 8 hours for the next five days and then only if needed for pain.  Short-Acting Narcotic (example oxycodone immediate release, dilaudid)                 Directions:  take prescribed amount by mouth every 3 hours as needed for breakthrough pain.    What is breakthrough pain?  Breakthrough pain is pain that is NOT controlled by the longer-acting pain pill.    Directions for Use:  1. Start taking the  longer-acting pain medication at bedtime on the day of your surgery; regardless of if you   have pain.  Since this medication is taken every 12 hours, you don t want to wait until the middle of the night to start it, or you will continue to have to take it in the middle of the night to stay on schedule.      2. DO NOT CRUSH, CHEW OR DISSOLVE THE TABLET.    3. If you have uncontrolled pain (breakthrough) during the 12 hours, take the short acting pain pill as prescribed.    4. Some patients need to take both the longer acting and short acting pain pills for the first few days to control their pain.    5. ALWAYS take with food (soup, jello, fruit or nuts).    6. Pain pills can cause constipation.  Start taking the Miralax one packet or capful daily the morning after surgery.  Continue until you stop using narcotics.    7. If the pain pill causes nausea, take the medication for nausea (if prescribed).  Wait for one hour after taking the anti-nausea medication before you resume your pain pills.    8. If you have itching, take over-the-counter diphenhydramine (Benadryl) as directed on the label.  If you have a rash or hives, do not resume your pain medication until an allergic reaction is ruled out.  Call physician for further instructions.    9. STOP TAKING THE MEDICATIONS AND CALL YOUR DOCTOR IF:       ?  You have uncontrolled nausea and or vomiting     ?  You have a rash or hives     ?   IF YOU HAVE TROUBLE BREATHING OR SWALLOWING, CALL 911           GENERAL ANESTHESIA OR SEDATION ADULT DISCHARGE INSTRUCTIONS   SPECIAL PRECAUTIONS FOR 24 HOURS AFTER SURGERY    IT IS NOT UNUSUAL TO FEEL LIGHT-HEADED OR FAINT, UP TO 24 HOURS AFTER SURGERY OR WHILE TAKING PAIN MEDICATION.  IF YOU HAVE THESE SYMPTOMS; SIT FOR A FEW MINUTES BEFORE STANDING AND HAVE SOMEONE ASSIST YOU WHEN YOU GET UP TO WALK OR USE THE BATHROOM.    YOU SHOULD REST AND RELAX FOR THE NEXT 24 HOURS AND YOU MUST MAKE ARRANGEMENTS TO HAVE SOMEONE STAY WITH YOU  FOR AT LEAST 24 HOURS AFTER YOUR DISCHARGE.  AVOID HAZARDOUS AND STRENUOUS ACTIVITIES.  DO NOT MAKE IMPORTANT DECISIONS FOR 24 HOURS.    DO NOT DRIVE ANY VEHICLE OR OPERATE MECHANICAL EQUIPMENT FOR 24 HOURS FOLLOWING THE END OF YOUR SURGERY.  EVEN THOUGH YOU MAY FEEL NORMAL, YOUR REACTIONS MAY BE AFFECTED BY THE MEDICATION YOU HAVE RECEIVED.    DO NOT DRINK ALCOHOLIC BEVERAGES FOR 24 HOURS FOLLOWING YOUR SURGERY.    DRINK CLEAR LIQUIDS (APPLE JUICE, GINGER ALE, 7-UP, BROTH, ETC.).  PROGRESS TO YOUR REGULAR DIET AS YOU FEEL ABLE.    YOU MAY HAVE A DRY MOUTH, A SORE THROAT, MUSCLES ACHES OR TROUBLE SLEEPING.  THESE SHOULD GO AWAY AFTER 24 HOURS.    CALL YOUR DOCTOR FOR ANY OF THE FOLLOWING:  SIGNS OF INFECTION (FEVER, GROWING TENDERNESS AT THE SURGERY SITE, A LARGE AMOUNT OF DRAINAGE OR BLEEDING, SEVERE PAIN, FOUL-SMELLING DRAINAGE, REDNESS OR SWELLING.    IT HAS BEEN OVER 8 TO 10 HOURS SINCE SURGERY AND YOU ARE STILL NOT ABLE TO URINATE (PASS WATER).     Summary of Visit     Reason for your hospital stay       Infection on hardware identified during surgery  Surgery aborted             After Care     Diet       Follow this diet upon discharge: Orders Placed This Encounter      Regular Diet Adult             Follow-Up Appointment Instructions     Future Labs/Procedures    Follow-up and recommended labs and tests      Comments:    Follow up with Arie banegas, within 2-3 weeks. to evaluate after surgery.  No follow up labs or test are needed.  Plan surgery for after IV abx discontinued      Follow-Up Appointment Instructions     Follow-up and recommended labs and tests        Follow up with Arie banegas, within 2-3 weeks. to evaluate after surgery.  No follow up labs or test are needed.  Plan surgery for after IV abx discontinued             Statement of Approval     Ordered          08/31/18 1545  I have reviewed and agree with all the recommendations and orders detailed in this  document.     Approved and electronically signed by:  Scott Ward MD           08/30/18 1733  I have reviewed and agree with all the recommendations and orders detailed in this document.  EFFECTIVE NOW     Approved and electronically signed by:  Arie Rodrigues MD

## 2018-08-29 NOTE — OP NOTE
Procedure Date: 08/29/2018      DATE OF SURGERY:  08/29/2018.      PREOPERATIVE DIAGNOSES:     1.  Left foot multiple malunions.   2.  Identified in preop new onset of drainage from medial foot wound.   3.  Identified in preop chronic ulceration, fifth metatarsal head.      POSTOPERATIVE DIAGNOSES:     1.  Infection over hardware, left first ray, first MTP joint.     2.  Ulceration left fifth metatarsal head.      SURGICAL PROCEDURE:   1.  Left foot removal of hardware.   2.  Sharp excisional debridement through skin and subcutaneous tissues down to bone on the first MTP joint with application of stimulant absorbable antibiotic vancomycin impregnated beads.   3.  Debridement of callus through skin and subcutaneous tissues fifth metatarsal head region plantarly.      SURGEON:  Arie Rodrigues MD.      ANESTHESIA:  General with popliteal.      COMPLICATIONS:  None.      BLOOD LOSS:  Minimal.      TOURNIQUET TIME:  Less than 45 minutes.      INDICATIONS:  Ms. Harrell had planned to undergo a complex revision forefoot surgery for a surgical procedure that had healed in malunited positions, both in the first and second rays, but also had clawing of toes that she needed to have addressed.  However, in preop, she mentions to me that she had drainage from the medial side of her great toe that she had not had before, and it was becoming more and more painful from there.  The drainage had been going on for only a few days.  I discussed with her and her family the risks, benefits, advantages, disadvantages, complications as well as if I identified anything wrong with the drainage, surgery might need to be postponed.  She understood and agreed to proceed.  She also identified this callusing was more painful in the fifth metatarsal head and felt that was due to the fact she was walking off of the onset of her foot because it hurt so much on her great toe.      DESCRIPTION OF PROCEDURE:  She was appropriately prepped, draped and  positioned on the operating table, and after satisfactory induction of anesthesia and administration of IV antibiotics and observation of Versed protocols, I made an incision ellipsing the small tiny area on the medial side of the first metatarsal where there was no redness or obvious drainage today.  I took it sharply through the skin and subcutaneous tissues, and as I reached the hardware on the medial side of the foot, I identified gross purulence directly over the screw.  I debrided the area around the screw and removed the screw, which felt relatively well fixed as it was unthreaded, but the fact that the area had purulent tissue on it changed the ability for me to do this complex reconstructive surgery which was going to require more hardware in this area as well as allograft bone in another area of the foot.  I removed the screws and the second screw distally as well.  I began to irrigate.  I identified that there was a connection between these screws as the saline flowed through.  I used Betadine irrigation and then debrided in this area.  I then packed this area with stimulant antibiotic into both screw holes and closed the wound in 1 full thickness layer.  I turned my attention to the dorsal medial mid foot and through the previous incision on the first TMT joint, I made an incision, identified these screws.  They were all good and bone over heel there were no signs of infection.  The screws were removed without difficulty.  This wound was irrigated and closed in layers.  I turned my attention to the fifth metatarsal head where I sharply debrided this area to the callus to the bleeding subcutaneous tissues to help this area heal.  There were no signs of infection in this region.      I discussed the findings with the family and recommended admission to the hospital, IV vancomycin for coverage, Infectious Disease consultation.  We will need to delay her surgery until this is adequately treated from an  Infectious Disease Service standpoint.  Then we will come back and be able to do this as a staged procedure of this forefoot.         SHAHAB DUNN MD             D: 2018   T: 2018   MT: KEITH      Name:     HEATHER QUINTANILLA   MRN:      [id number]        Account:        [id number]   :      1970           Procedure Date: 2018      Document: [id number]

## 2018-08-29 NOTE — ANESTHESIA CARE TRANSFER NOTE
Patient: Genoveva Harrell    Procedure(s):  Left Foot Incision and Drainage fifth Metatarsal head, Superficial Debridement Ulcer left foot, Incision and drainage Left Great Toe, Removal of Infected Hardwar and application of Stimulan and antibiotic   - Wound Class: I-Clean   - Wound Class: I-Clean    Diagnosis: painful hardware  Diagnosis Additional Information: No value filed.    Anesthesia Type:   General, Periph. Nerve Block for postop pain     Note:  Airway :Face Mask  Patient transferred to:PACU  Comments: Report and sign off to RN in PACU.  Good resps, skin pink, monitors on, VSS,  O2 via Face Tent.Handoff Report: Identifed the Patient, Identified the Reponsible Provider, Reviewed the pertinent medical history, Discussed the surgical course, Reviewed Intra-OP anesthesia mangement and issues during anesthesia, Set expectations for post-procedure period and Allowed opportunity for questions and acknowledgement of understanding      Vitals: (Last set prior to Anesthesia Care Transfer)    CRNA VITALS  8/29/2018 0834 - 8/29/2018 0911      8/29/2018             NIBP: 115/71    NIBP Mean: 81                Electronically Signed By: MAGGIE Aguilera CRNA  August 29, 2018  9:11 AM

## 2018-08-29 NOTE — PROGRESS NOTES
"Report given to 6th floor nurse.  Patients  called and notified of patients room number on arrival to 6th floor.  Patient currently tearful, \"feel like shoved down in a corner, but I understand you need a room for me\".  Will continue to monitor until transport to the floor.    "

## 2018-08-29 NOTE — CONSULTS
Hospitalist Consultation      Genoveva Harrell MRN# [id number]   YOB: 1970 Age: 48 year old   Date of Admission: 8/29/2018     Requesting Physician:  Dr. Arie Rodrigues  Reason for consult: Medical co-management           Assessment and Plan:   This patient is a 48 year old female with a PMH significant for HTN, hypothyroidism, KAR, who is POD 0 s/p infected hardware removal from left foot (left 1st ray, 1st MTP joint), excisional debridement on the first MTP joint w/ application of antibiotic vancomycin impregnated beads, and debridement of callus/ulceration of skin over the left 5th metatarsal head.    1. S/p L foot removal of hardware, I&D down to bone on 1st MTP w/ application of antibiotic beads, and debridement of 5th metatarsal head callus: Doing well, cont PT/OT and pain control as per primary. The patient is currently on IV vancomycin and it appears that the primary service is planning on consulting Infectious Disease. Started BID probiotic while on IV antibiotic therapy. DVT Prophylaxis: on PCDs as per primary.    2. Hypertension: Resumed PTA lisinopril-hydrochlorothiazide 20-12.5 mg daily (with cocurrent) potassium replacement) and amlodipine 10 mg daily w/ hold parameters.    3. Hypothyroidism: Resumed PTA [place] thyroid 90 mg daily.    4. Generalized anxiety disorder: Resumed PTA citalopram 40 mg daily.    5. Tobacco dependence: Patient is a daily 1 ppd smoker for approximately 30 years. Continue nicotine patch 21 mg/24 hr.         History of Present Illness:   This patient is a 48 year old female who is POD 0 s/p left foot (left 1st ray, 1st MTP joint), excisional debridement on the first MTP joint w/ application of antibiotic vancomycin impregnated beads, and debridement of callus/ulceration of skin over the left 5th metatarsal head. The patient was supposed to undergo a complex revision forefoot surgery for a surgical procedure that had healed in malunited positions (1st and 2nd rays) as  well as addressing clawing of toes. However, in pre-op she mentioned she had drainage for the past couple of days from the medial side of her great toe that she had not had before, and it was becoming more and more painful from that site.   Intra-op report reviewed, and gross purulence was noted over the screw. That area was debrided and the screw was removed. The area was irrigated and packed with stimulant antibiotic in the screw holes, and the wound was closed. Surgeon also debrided the 5th metatarsal head area where a callus had built up.   Dr. Rodrigues admitted the patient to the hospital following this procedure and started the patient on IV vancomycin. He wants to request an Infectious Disease consult. The originally planned surgery will need to be delayed until she is adequately treated from an Infectious Disease standpoint.   I/o's reviewed, Currently net +995 with good UOP since OR. Hgb in 11/2017 16.4.  Patient feeling well currently, pain controlled as peripheral block still in effect. VSS.   Currently, today brady diet, no CP, SOB, no n/v.   O/w other medical problems have been stable, with no recent c/o illness.               Past Medical History:     Past Medical History:   Diagnosis Date     Hypertension      Thyroid disease           Past Surgical History:     Past Surgical History:   Procedure Laterality Date     GYN SURGERY      partial hysterectomy     HERNIA REPAIR  07/2018     ORTHOPEDIC SURGERY      left foot surgery          Social History:     Social History   Substance Use Topics     Smoking status: Current Every Day Smoker     Packs/day: 1.00     Types: Cigarettes     Smokeless tobacco: Never Used     Alcohol use No          Family History:   Family Hx fully reviewed and is non contributory to this admission.          Allergies:   No Known Allergies          Medications:     Prior to Admission medications    Medication Sig Last Dose Taking? Auth Provider   AMLODIPINE BESYLATE PO Take 10 mg  "by mouth daily 8/28/2018 at Unknown time Yes Reported, Patient   Citalopram Hydrobromide (CELEXA PO) Take 40 mg by mouth 8/28/2018 at Unknown time Yes Reported, Patient   lisinopril-hydrochlorothiazide (PRINZIDE/ZESTORETIC) 20-12.5 MG per tablet Take 1 tablet by mouth daily 8/28/2018 at Unknown time Yes Reported, Patient   NAPROXEN PO Take 500 mg by mouth 2 times daily as needed for moderate pain 8/22/2018 Yes Reported, Patient   potassium chloride (KLOR-CON) 20 MEQ Packet Take 20 mEq by mouth daily 8/28/2018 at Unknown time Yes Reported, Patient   THYROID PO Take 90 mg by mouth 8/28/2018 at Unknown time Yes Reported, Patient   VITAMIN D, CHOLECALCIFEROL, PO Take 2,000 Units by mouth daily 8/28/2018 at Unknown time Yes Reported, Patient          Review of Systems:   A comprehensive greater than 10 system review of systems was carried out.  Pertinent positives and negatives are noted above.  Otherwise negative for contributory info.            Physical Exam:   Vitals were reviewed  Blood pressure 135/76, pulse 82, temperature 99  F (37.2  C), temperature source Temporal, resp. rate 16, height 1.676 m (5' 6\"), weight 76.2 kg (168 lb), SpO2 94 %.  Exam:    GENERAL:  Comfortable.  PSYCH: pleasant, oriented, No acute distress.  HEENT:  PERRLA. Normal conjunctiva, normal hearing, nasal mucosa and Oropharynx are normal.  NECK:  Supple, no neck vein distention, adenopathy or bruits, normal thyroid.  HEART:  Normal S1, S2 with no murmur, no pericardial rub, S3 or S4.  LUNGS:  Clear to auscultation, normal Respiratory effort.  ABDOMEN:  Soft, no hepatosplenomegaly, normal bowel sounds.  EXTREMITIES:  No pedal edema, +2 pulses bilateral and equal. Left foot dressing c/d/i  SKIN:  Dry to touch, No rash, wound or ulcerations.  NEUROLOGIC:  Nonfocal with normal cranial nerve and motor power and sensation.          Data:   Past 24 hours labs, studies, and imaging were reviewed.  Results for orders placed or performed during the " hospital encounter of 08/29/18   EKG 12-lead, tracing only   Result Value Ref Range    Interpretation ECG Click View Image link to view waveform and result    Anaerobic bacterial culture   Result Value Ref Range    Specimen Description Left Foot Wound     Special Requests       Specimen collected in eSwab transport (white cap)  Received in anaerobic tubes.      Culture Micro PENDING    Gram stain   Result Value Ref Range    Specimen Description Left Foot Wound     Special Requests Specimen collected in eSwab transport (white cap)     Gram Stain No organisms seen     Gram Stain Moderate  WBC'S seen  predominantly PMN's      Gram stain   Result Value Ref Range    Specimen Description Left Foot Tissue And hardware     Gram Stain Rare  Gram positive cocci   (A)     Gram Stain Few  WBC'S seen  predominantly PMN's       Gram Stain       Gram stain slide reviewed at the [facility]    Gram Stain (Note)  PGRAM ON 8.29.18 BY CK      Wound Culture Aerobic Bacterial   Result Value Ref Range    Specimen Description Left Foot Wound     Special Requests Specimen collected in eSwab transport (white cap)     Culture Micro PENDING      Leial Diaz PA-C    Pt discussed with Dr. Hernandez who agrees with the care as discussed above.

## 2018-08-29 NOTE — PHARMACY-ADMISSION MEDICATION HISTORY
"Admission medication history interview status for this patient is complete. See [facility] admission navigator for allergy information, prior to admission medications and immunization status.     Medication history interview source(s):Patient  Medication history resources (including written lists, pill bottles, clinic record):None  Primary pharmacy: [facility]     Changes made to PTA medication list:  Added: none  Deleted: none  Changed: amlodipine from AM to PM; added \"daily\" to thyroid and citalopram; potassium from 20 mEq packet daily to 8 mEq tab 3x/week.     Actions taken by pharmacist (provider contacted, etc):None     Additional medication history information:None    Medication reconciliation/reorder completed by provider prior to medication history? Yes    Do you take OTC medications (eg tylenol, ibuprofen, fish oil, eye/ear drops, etc)? Y (Y/N)    For patients on insulin therapy: N (Y/N)    Prior to Admission medications    Medication Sig Last Dose Taking? Auth Provider   amLODIPine (NORVASC) 10 MG tablet Take 10 mg by mouth every evening 8/28/2018 at pm Yes Unknown, Entered By History   citalopram (CELEXA) 40 MG tablet Take 40 mg by mouth daily 8/28/2018 at am Yes Unknown, Entered By History   lisinopril-hydrochlorothiazide (PRINZIDE/ZESTORETIC) 20-12.5 MG per tablet Take 1 tablet by mouth daily 8/28/2018 at am Yes Reported, Patient   NAPROXEN PO Take 500 mg by mouth 2 times daily as needed for moderate pain 8/22/2018 at unknown time Yes Reported, Patient   potassium chloride (KLOR-CON) 8 MEQ CR tablet Take 8 mEq by mouth three times a week On Mondays, Wednesdays, Fridays 8/27/2018 at am Yes Unknown, Entered By History   Thyroid 90 MG TABS Take 90 mg by mouth daily 8/28/2018 at am Yes Unknown, Entered By History   VITAMIN D, CHOLECALCIFEROL, PO Take 2,000 Units by mouth daily 8/28/2018 at zm Yes Reported, Patient           "

## 2018-08-30 ENCOUNTER — APPOINTMENT (OUTPATIENT)
Dept: PHYSICAL THERAPY | Facility: CLINIC | Age: 48
End: 2018-08-30
Attending: ORTHOPAEDIC SURGERY
Payer: COMMERCIAL

## 2018-08-30 ENCOUNTER — HOME INFUSION (PRE-WILLOW HOME INFUSION) (OUTPATIENT)
Dept: PHARMACY | Facility: CLINIC | Age: 48
End: 2018-08-30

## 2018-08-30 LAB
BASOPHILS # BLD AUTO: 0.1 10E9/L (ref 0–0.2)
BASOPHILS NFR BLD AUTO: 0.5 %
CRP SERPL-MCNC: 5.2 MG/L (ref 0–8)
DIFFERENTIAL METHOD BLD: NORMAL
EOSINOPHIL # BLD AUTO: 0.1 10E9/L (ref 0–0.7)
EOSINOPHIL NFR BLD AUTO: 0.7 %
ERYTHROCYTE [DISTWIDTH] IN BLOOD BY AUTOMATED COUNT: 13.9 % (ref 10–15)
ERYTHROCYTE [SEDIMENTATION RATE] IN BLOOD BY WESTERGREN METHOD: 8 MM/H (ref 0–20)
GLUCOSE SERPL-MCNC: 107 MG/DL (ref 70–99)
HCT VFR BLD AUTO: 39.9 % (ref 35–47)
HGB BLD-MCNC: 13.2 G/DL (ref 11.7–15.7)
IMM GRANULOCYTES # BLD: 0.1 10E9/L (ref 0–0.4)
IMM GRANULOCYTES NFR BLD: 0.5 %
LYMPHOCYTES # BLD AUTO: 3.4 10E9/L (ref 0.8–5.3)
LYMPHOCYTES NFR BLD AUTO: 32 %
MCH RBC QN AUTO: 30.3 PG (ref 26.5–33)
MCHC RBC AUTO-ENTMCNC: 33.1 G/DL (ref 31.5–36.5)
MCV RBC AUTO: 92 FL (ref 78–100)
MONOCYTES # BLD AUTO: 0.6 10E9/L (ref 0–1.3)
MONOCYTES NFR BLD AUTO: 5.8 %
NEUTROPHILS # BLD AUTO: 6.5 10E9/L (ref 1.6–8.3)
NEUTROPHILS NFR BLD AUTO: 60.5 %
NRBC # BLD AUTO: 0 10*3/UL
NRBC BLD AUTO-RTO: 0 /100
PLATELET # BLD AUTO: 277 10E9/L (ref 150–450)
RBC # BLD AUTO: 4.36 10E12/L (ref 3.8–5.2)
VANCOMYCIN SERPL-MCNC: 8.1 MG/L
WBC # BLD AUTO: 10.7 10E9/L (ref 4–11)

## 2018-08-30 PROCEDURE — 80202 ASSAY OF VANCOMYCIN: CPT | Performed by: ORTHOPAEDIC SURGERY

## 2018-08-30 PROCEDURE — 97530 THERAPEUTIC ACTIVITIES: CPT | Mod: GP | Performed by: PHYSICAL THERAPIST

## 2018-08-30 PROCEDURE — 40000193 ZZH STATISTIC PT WARD VISIT: Performed by: PHYSICAL THERAPIST

## 2018-08-30 PROCEDURE — 82947 ASSAY GLUCOSE BLOOD QUANT: CPT | Performed by: ORTHOPAEDIC SURGERY

## 2018-08-30 PROCEDURE — 97116 GAIT TRAINING THERAPY: CPT | Mod: GP | Performed by: PHYSICAL THERAPIST

## 2018-08-30 PROCEDURE — 25000132 ZZH RX MED GY IP 250 OP 250 PS 637: Performed by: INTERNAL MEDICINE

## 2018-08-30 PROCEDURE — 25000132 ZZH RX MED GY IP 250 OP 250 PS 637: Performed by: ORTHOPAEDIC SURGERY

## 2018-08-30 PROCEDURE — 85652 RBC SED RATE AUTOMATED: CPT | Performed by: ORTHOPAEDIC SURGERY

## 2018-08-30 PROCEDURE — 99232 SBSQ HOSP IP/OBS MODERATE 35: CPT | Performed by: INTERNAL MEDICINE

## 2018-08-30 PROCEDURE — 25000128 H RX IP 250 OP 636: Performed by: ORTHOPAEDIC SURGERY

## 2018-08-30 PROCEDURE — 25000132 ZZH RX MED GY IP 250 OP 250 PS 637: Performed by: PHYSICIAN ASSISTANT

## 2018-08-30 PROCEDURE — 25000132 ZZH RX MED GY IP 250 OP 250 PS 637: Performed by: ANESTHESIOLOGY

## 2018-08-30 PROCEDURE — 97161 PT EVAL LOW COMPLEX 20 MIN: CPT | Mod: GP | Performed by: PHYSICAL THERAPIST

## 2018-08-30 PROCEDURE — 12000000 ZZH R&B MED SURG/OB

## 2018-08-30 PROCEDURE — 85025 COMPLETE CBC W/AUTO DIFF WBC: CPT | Performed by: ORTHOPAEDIC SURGERY

## 2018-08-30 PROCEDURE — 36415 COLL VENOUS BLD VENIPUNCTURE: CPT | Performed by: ORTHOPAEDIC SURGERY

## 2018-08-30 PROCEDURE — 85018 HEMOGLOBIN: CPT | Performed by: ORTHOPAEDIC SURGERY

## 2018-08-30 PROCEDURE — 86140 C-REACTIVE PROTEIN: CPT | Performed by: ORTHOPAEDIC SURGERY

## 2018-08-30 RX ORDER — OXYCODONE HYDROCHLORIDE 5 MG/1
5-10 TABLET ORAL
Status: DISCONTINUED | OUTPATIENT
Start: 2018-08-30 | End: 2018-08-31 | Stop reason: HOSPADM

## 2018-08-30 RX ORDER — HYDROMORPHONE HYDROCHLORIDE 1 MG/ML
.3-.5 INJECTION, SOLUTION INTRAMUSCULAR; INTRAVENOUS; SUBCUTANEOUS
Status: DISCONTINUED | OUTPATIENT
Start: 2018-08-30 | End: 2018-08-31 | Stop reason: HOSPADM

## 2018-08-30 RX ORDER — OXYCODONE HYDROCHLORIDE 5 MG/1
5-10 TABLET ORAL
Qty: 15 TABLET | Refills: 0 | Status: ON HOLD | OUTPATIENT
Start: 2018-08-30 | End: 2018-12-26

## 2018-08-30 RX ADMIN — LORAZEPAM 0.5 MG: 0.5 TABLET ORAL at 00:54

## 2018-08-30 RX ADMIN — Medication 250 MG: at 20:10

## 2018-08-30 RX ADMIN — VANCOMYCIN HYDROCHLORIDE 1500 MG: 10 INJECTION, POWDER, LYOPHILIZED, FOR SOLUTION INTRAVENOUS at 21:27

## 2018-08-30 RX ADMIN — OXYCODONE HYDROCHLORIDE 10 MG: 5 TABLET ORAL at 23:43

## 2018-08-30 RX ADMIN — OXYCODONE HYDROCHLORIDE 10 MG: 5 TABLET ORAL at 10:31

## 2018-08-30 RX ADMIN — CITALOPRAM HYDROBROMIDE 40 MG: 20 TABLET ORAL at 10:18

## 2018-08-30 RX ADMIN — OXYCODONE HYDROCHLORIDE 10 MG: 5 TABLET ORAL at 13:31

## 2018-08-30 RX ADMIN — LORAZEPAM 0.5 MG: 0.5 TABLET ORAL at 23:43

## 2018-08-30 RX ADMIN — OXYCODONE HYDROCHLORIDE 5 MG: 5 TABLET ORAL at 00:54

## 2018-08-30 RX ADMIN — VANCOMYCIN HYDROCHLORIDE 1250 MG: 10 INJECTION, POWDER, LYOPHILIZED, FOR SOLUTION INTRAVENOUS at 10:21

## 2018-08-30 RX ADMIN — OXYCODONE HYDROCHLORIDE 10 MG: 5 TABLET ORAL at 20:09

## 2018-08-30 RX ADMIN — OXYCODONE HYDROCHLORIDE 5 MG: 5 TABLET ORAL at 07:48

## 2018-08-30 RX ADMIN — NICOTINE 1 PATCH: 21 PATCH, EXTENDED RELEASE TRANSDERMAL at 10:20

## 2018-08-30 RX ADMIN — OXYCODONE HYDROCHLORIDE 10 MG: 5 TABLET ORAL at 16:53

## 2018-08-30 RX ADMIN — LEVOTHYROXINE, LIOTHYRONINE 90 MG: 19; 4.5 TABLET ORAL at 10:18

## 2018-08-30 RX ADMIN — Medication 250 MG: at 10:18

## 2018-08-30 ASSESSMENT — ACTIVITIES OF DAILY LIVING (ADL)
ADLS_ACUITY_SCORE: 11
ADLS_ACUITY_SCORE: 11
ADLS_ACUITY_SCORE: 15
ADLS_ACUITY_SCORE: 11
ADLS_ACUITY_SCORE: 15
ADLS_ACUITY_SCORE: 15

## 2018-08-30 NOTE — PLAN OF CARE
Problem: Patient Care Overview  Goal: Plan of Care/Patient Progress Review  Discharge Planner OT   Patient plan for discharge: Home w/ spouse  Current status: Order received, chart reviewed. Per discussion with PT, pt mobilizing with CGA/SBA and able to maintain NWB status. Pt had similar procedure 1 year ago and was able to complete ADLs without assistance, has no questions/concerns at this time. Pt declines the need for OT evaluation. IP OT order completed   Barriers to return to prior living situation: See PT note for barriers  Recommendations for discharge: Defer recommendations to PT  Rationale for recommendations: Pt familiar with NWB and compensatory techniques for ADL/IADL completion with use of knee scooter. Pt with no skilled IP OT needs.        Entered by: Rizwana Vance 08/30/2018 12:31 PM

## 2018-08-30 NOTE — PLAN OF CARE
Problem: Patient Care Overview  Goal: Plan of Care/Patient Progress Review  Outcome: Improving  Pt up SBA with walker.A/O. VSS. Held Lisinpril/htcz per perimeters. Nicotine patch on right shoulder. Pain 8/10 in left foot. Oxycodone given x 2-effective. Paged md this morning for increase pain ,changed oxy to every 3 hours and 5-10mg. Continues on IV vancomycin. Cultures came back negative. Gram stain came back gram positive cocci.  Up to chair for meals. Appetite good. Drinking and voiding adequate amounts. CMS intact. Plan to discharge home today or tomorrow when surgery okays it. Will continue to monitor.

## 2018-08-30 NOTE — PLAN OF CARE
Problem: Patient Care Overview  Goal: Plan of Care/Patient Progress Review  Outcome: Improving  Mobility: Assist of one   LS:Clear lung sounds  BS:Active bowel,sounds  :Voididng  CMS:mild numbness to the LLE  DRSG:clean, dry and intact  Pt is alert and oriented, VSS, Oxycodone for pain. Pt reports numbness to foot. Ativan for anxiety. passing flatus. Tolerating regular diet.  Capno on. Pt on RA.

## 2018-08-30 NOTE — CONSULTS
Consult Date:  08/30/2018      INFECTIOUS DISEASE CONSULTATION       LOCATION:  Room 646, [facility].        REFERRING PHYSICIAN:  Arie Rodrigues MD      IMPRESSION:  A 48-year-old healthy female with repeated malunions after left foot bunion and hammertoe surgeries, came for operative intervention to repair, surprise finding preop of some drainage medially and evidence at surgery of deep infection, unclear whether major bone involvement, inflammatory markers normal, only recent onset of drainage and no systemic symptoms.  Gram stain positive for gram-positive cocci and culture pending.      RECOMMENDATIONS:   1.  Await culture and adjust vancomycin for now.     2.  Further input from Surgery regarding how deep and how major a problem, could range from some oral therapy here to extended IV antibiotics.  With that possibility in mind will have Social Service check on insurance coverage.      HISTORY:  This 48-year-old female is seen in consultation due to apparent left foot infection.  The patient has a history of hammertoe and bunion issues in that foot and has had a couple of surgeries by [facility].  They had malunion related issues, but never any perceived infection.  The last operative intervention was in 12/2017.  Doing okay since then but was malunion and thus, Dr. Rodrigues was consulted for further operative intervention.  That was planned for yesterday, but in preop evaluation it was noted there was new drainage of about a week's duration occurring from the medial area where there is hardware in place.  As a result, he changed the surgery to an I and D of that area and there was indeed evidence of possible deep infection.  The hardware was removed.  There was some evidence of infection and cultures were obtained.  The Gram stain shows gram-positive cocci and she is now on IV vancomycin.  She had vancomycin-impregnated beads placed as well.  She feels completely well, has had no  systemic symptoms.  There has been ongoing and, in fact, worsening pain in the foot, specifically in that medial area as well.      PAST MEDICAL HISTORY:  No major infection problem, otherwise historically generally healthy person.      ALLERGIES:  NONE.      SOCIAL/FAMILY HISTORY:  No recent travel or exposures.  No known resistant pathogens.      MEDICATIONS:  As listed.      REVIEW OF SYSTEMS:  Considerable pain in the foot but no systemic symptoms of any kind.      PHYSICAL EXAMINATION:   GENERAL:  The patient appears her stated age, looks well.   VITAL SIGNS:  All normal including being afebrile.   HEENT:  No thrush or intraoral lesions.  Pupils reactive.   NECK:  Supple and nontender.   HEART AND LUNGS:  Unremarkable.   ABDOMEN:  Soft and nontender.   EXTREMITIES:  No rashes or skin lesions.  No proximal signs of infection in the left foot.  The left foot is wrapped.      LABORATORY DATA:  Inflammatory markers normal.  CBC unremarkable.      Thank you very much for this consultation.  I will follow the patient with you.         GAETANO NARAYAN MD             D: 2018   T: 2018   MT: CC      Name:     HEATHER QUINTANILLA   MRN:      [id number]        Account:       [id number]   :      1970           Consult Date:  2018      Document: [id number]       cc: Arie Rodrigues MD

## 2018-08-30 NOTE — PLAN OF CARE
Problem: Patient Care Overview  Goal: Plan of Care/Patient Progress Review  Outcome: Improving  Pt alert&oriented. Up with assist of A1 pivot to bedside commode.  Given dilaudid 0.5mlX1. Pt reported that it was too strong for her but did relief the pain. Received telephone order for oxycodone 5mg q6H PRN . Ace wrap CDI. Pt reports numbness to foot. Reported that some sensation is coming back. Not able to wiggle toes. Pt appears anxious due to current condition. Reassurance provided. Bowels active, passing flatus. Tolerating regular diet. Voiding well. Lungs CTA.  Capno on. Pt on RA. VSS

## 2018-08-30 NOTE — PROGRESS NOTES
Therapy: IV abx  Insurance: [facility]    Co-Insurance: 100%    In reference to admission on 8/29/18 to check IV abx coverage    Please contact Intake with any questions, [phone] or In Basket pool, [facility] ([id number]).

## 2018-08-30 NOTE — CONSULTS
ID consult dictated IMP 1 49 yo female planned L foot complex repair, some drainage 1st met area hardware, I and D , cx pending    REc vanco for now, unclear how major infection

## 2018-08-30 NOTE — PROGRESS NOTES
"[facility]  PROGRESS NOTE    ASSESSMENT   Left foot infection on hardware  No signs of deep bone infection as union solid   But Infection present on metal screw with obvious pus. Clearly a very non-aggressive bacteria.   However, we will be doing a very complex  Upcoming surgery with a closing wedge osteotomy in the region of the location of the infection requiring metal fixation; as well as an opening wedge osteotomy using both a wedge opening plate and bone graft; We cannot have compromised area of potential residual infection or a risk of severe complication is an issue. Otherwise doing well    PLAN   Follow recs from ID  DC when johann tavares ID    HISTORY   Answered questions to patient.   Pain in control  Doing well     +Gram Cocci Cultures pending     Data:   Vitals: /70  Pulse 82  Temp 98.3  F (36.8  C) (Oral)  Resp 16  Ht 1.676 m (5' 6\")  Wt 76.2 kg (168 lb)  SpO2 97%  BMI 27.12 kg/m2  Results for orders placed or performed during the hospital encounter of 08/29/18 (from the past 24 hour(s))   Glucose   Result Value Ref Range    Glucose 107 (H) 70 - 99 mg/dL   CBC with platelets differential   Result Value Ref Range    WBC 10.7 4.0 - 11.0 10e9/L    RBC Count 4.36 3.8 - 5.2 10e12/L    Hemoglobin 13.2 11.7 - 15.7 g/dL    Hematocrit 39.9 35.0 - 47.0 %    MCV 92 78 - 100 fl    MCH 30.3 26.5 - 33.0 pg    MCHC 33.1 31.5 - 36.5 g/dL    RDW 13.9 10.0 - 15.0 %    Platelet Count 277 150 - 450 10e9/L    Diff Method Automated Method     % Neutrophils 60.5 %    % Lymphocytes 32.0 %    % Monocytes 5.8 %    % Eosinophils 0.7 %    % Basophils 0.5 %    % Immature Granulocytes 0.5 %    Nucleated RBCs 0 0 /100    Absolute Neutrophil 6.5 1.6 - 8.3 10e9/L    Absolute Lymphocytes 3.4 0.8 - 5.3 10e9/L    Absolute Monocytes 0.6 0.0 - 1.3 10e9/L    Absolute Eosinophils 0.1 0.0 - 0.7 10e9/L    Absolute Basophils 0.1 0.0 - 0.2 10e9/L    Abs Immature Granulocytes 0.1 0 - 0.4 10e9/L    Absolute Nucleated RBC 0.0  "   CRP inflammation   Result Value Ref Range    CRP Inflammation 5.2 0.0 - 8.0 mg/L   Erythrocyte sedimentation rate auto   Result Value Ref Range    Sed Rate 8 0 - 20 mm/h   Social Work IP Consult    Monika Stack RN     8/30/2018 11:02 AM  Care Transitions Team: Following for CC, discharge planning, and   disposition.       Per chart review pt identified as having consultation with ID   potential for IV ABX post discharge.      Benefit check in process.      Will continue to follow      Monika Lopez RN, BSN, Care Transitions Specialist   Care Transitions Team  [phone]      Care Transition RN/SW IP Consult    Monika Stack RN     8/30/2018 11:02 AM  Care Transitions Team: Following for CC, discharge planning, and   disposition.       Per chart review pt identified as having consultation with ID   potential for IV ABX post discharge.      Benefit check in process.      Will continue to follow      Monika Lopez RN, BSN, Care Transitions Specialist   Care Transitions Team  [phone]          Recent Labs  Lab 08/29/18  0833 08/29/18  0831   CULT Culture negative monitoring continues  Culture negative monitoring continues Culture negative monitoring continues  Culture negative monitoring continues           SHAHAB DUNN MD  [email or web address]  [email or web address]  [phone]  [facility]

## 2018-08-30 NOTE — PROGRESS NOTES
08/30/18 1004   Quick Adds   Type of Visit Initial PT Evaluation   Living Environment   Lives With spouse;child(hal), dependent  (2 boys, ages 8 and 12)   Living Arrangements house   Home Accessibility stairs to enter home   Number of Stairs to Enter Home 3   Number of Stairs Within Home (main level bed/bath)   Stair Railings at Home outside, present on right side   Transportation Available family or friend will provide   Living Environment Comment Pt reports her spouse is on disability but able to help out if needed. Son's helped her prn after previous foot surgery.   Self-Care   Dominant Hand right   Usual Activity Tolerance good   Current Activity Tolerance fair   Regular Exercise no   Equipment Currently Used at Home none   Activity/Exercise/Self-Care Comment Indep PLOF, has a knee scooter rented already.  Used knee scooter after previous foot surgery, did well.  No other AE/AD, used walk in shower, able to stand NWB with grab bar/shelf, was indep with ADLs.   Functional Level Prior   Ambulation 0-->independent   Transferring 0-->independent   Toileting 0-->independent   Bathing 0-->independent   Dressing 0-->independent   Eating 0-->independent   Communication 0-->understands/communicates without difficulty   Swallowing 0-->swallows foods/liquids without difficulty   Cognition 0 - no cognition issues reported   Fall history within last six months no   Which of the above functional risks had a recent onset or change? ambulation;transferring   General Information   Onset of Illness/Injury or Date of Surgery - Date 08/29/18   Referring Physician Arie Rodrigues MD   Patient/Family Goals Statement d/c home with spouse/family support   Pertinent History of Current Problem (include personal factors and/or comorbidities that impact the POC) Pt seen POD #1 s/p L foot removal of hardware, I&D down to bone on 1st MTP w/ application of antibiotic beads, and debridement of 5th metatarsal head callus. Plan in  "place for return to surgery in upcoming weeks, recieving IV Abx currently.  Originally underwent bunion correction surgery years ago, had hardware placed last year, then having subsequent pain/impaired mobility.  PMH including HTN, anxiety.   Precautions/Limitations fall precautions   Weight-Bearing Status - LLE nonweight-bearing  (orders for \"foot flat\", but pt indicating NWB )   General Observations pt awake and alert, reports block is now wearing off, can move foot/toes   Cognitive Status Examination   Orientation orientation to person, place and time   Pain Assessment   Patient Currently in Pain Yes, see Vital Sign flowsheet  (5-6/10 L foot)   Integumentary/Edema   Integumentary/Edema Comments Ace wrap in place L foot, no overt drainage noted, no drains, no swelling noted proximally   Posture    Posture Not impaired   Range of Motion (ROM)   ROM Comment WNL BUEs, RLE, and proximally LLE   Strength   Strength Comments WNL RLE and BUEs at least 4+ to 5/5 throughout, good functional strength proximally LLE, able to demo SLR indep.   Bed Mobility   Bed Mobility Comments SBA supine > sit EOB   Transfer Skills   Transfer Comments CGA sit <> stand with FWW, NWB L foot maintained well.   Gait   Gait Comments Able to ambulate at bedside with FWW and CGA x 5ft, NWB L foot, good ability to push/offload LLE with UE support through walker.  Pt states she prefers to use knee scooter instead.   Balance   Balance Comments Impaired standing balance and mobility safety due to NWB LLE, but compensating very well with FWW, anticipate good ability to compensate with knee scooter once available.   Sensory Examination   Sensory Perception no deficits were identified   Coordination   Coordination no deficits were identified   Muscle Tone   Muscle Tone no deficits were identified   Modality Interventions   Planned Modality Interventions Cryotherapy   Planned Modality Interventions Comments prn L foot   General Therapy Interventions " "  Planned Therapy Interventions balance training;bed mobility training;gait training;neuromuscular re-education;ROM;strengthening;stretching;transfer training;risk factor education;home program guidelines;progressive activity/exercise   Clinical Impression   Criteria for Skilled Therapeutic Intervention yes, treatment indicated   PT Diagnosis Impaired functional mobility   Influenced by the following impairments L foot pain, limited WB status, impaired balance due to WB restriction   Functional limitations due to impairments Impaired functional transfers skills, gait, knee scooter, stairs to enter home   Clinical Presentation Stable/Uncomplicated   Clinical Presentation Rationale stable status post op, indep PLOF, previous experience with NWB after foot surgery, age, PMH   Clinical Decision Making (Complexity) Low complexity   Therapy Frequency` daily   Predicted Duration of Therapy Intervention (days/wks) 3 days   Anticipated Equipment Needs at Discharge (pt has knee scooter, consider shower chair and FWW?)   Anticipated Discharge Disposition Home with Assist   Risk & Benefits of therapy have been explained Yes   Patient, Family & other staff in agreement with plan of care Yes   [facility] [employer] TM \"6 Clicks\"   2016, Trustees of [facility], under license to [employer].  All rights reserved.   6 Clicks Short Forms Basic Mobility Inpatient Short Form   [facility]  \"6 Clicks\" V.2 Basic Mobility Inpatient Short Form   1. Turning from your back to your side while in a flat bed without using bedrails? 4 - None   2. Moving from lying on your back to sitting on the side of a flat bed without using bedrails? 3 - A Little   3. Moving to and from a bed to a chair (including a wheelchair)? 3 - A Little   4. Standing up from a chair using your arms (e.g., wheelchair, or bedside chair)? 3 - A Little   5. To walk in hospital room? 3 - A Little   6. Climbing 3-5 steps with a railing? 2 - A Lot "   Basic Mobility Raw Score (Score out of 24.Lower scores equate to lower levels of function) 18   Total Evaluation Time   Total Evaluation Time (Minutes) 20

## 2018-08-30 NOTE — PLAN OF CARE
Problem: Patient Care Overview  Goal: Plan of Care/Patient Progress Review    PT: Eval complete, treatment initiated.  Pt seen POD #1 s/p L foot hardware removal and placement of antibiotic beads.  Pt lives with spouse and 2 boys (ages 8 and 12) in a single level home, 3 steps to enter.  She functions independently at baseline, and already has a knee scooter rented for home.  Previously, she underwent hardware placement to her L foot 1 yr ago, did well with NWB using knee scooter, was able to complete all ADLs without assistance.    Discharge Planner PT   Patient plan for discharge: home with spouse  Current status: Mobilizing well, able to demo NWB standing and transfers with FWW with CGA/SBA.  Feels comfortable using knee scooter from previous experience, will trial this afternoon or tomorrow.  Pain rated 5-6/10 L foot.  Ed on keeping leg elevated.    Addendum PM:  Mobilizing well with knee scooter, SBA navigating hallways.  Will progress stairs tomorrow.    Barriers to return to prior living situation: 3 steps to enter home  Recommendations for discharge: home with family support/assistance, knee scooter  Rationale for recommendations: Anticipate patient will progress well given PLOF and current functional status.  Will benefit from ongoing skilled PT intervention to improve mobility status prior to returning home.         Entered by: Isidro Joshi 08/30/2018 10:52 AM

## 2018-08-30 NOTE — PHARMACY-VANCOMYCIN DOSING SERVICE
Pharmacy Vancomycin Initial Note  Date of Service 2018  Patient's  1970  48 year old, female    Indication: Bone and Joint Infection    Current estimated CrCl = Estimated Creatinine Clearance: 139.2 mL/min (based on Cr of 0.51).    Creatinine for last 3 days  2018:  3:25 PM Creatinine 0.51 mg/dL    Recent Vancomycin Level(s) for last 3 days  No results found for requested labs within last 72 hours.      Vancomycin IV Administrations (past 72 hours)                   vancomycin (VANCOCIN) 1000 mg in dextrose 5% 200 mL PREMIX (g) 1 g Given 18 0838                Nephrotoxins and other renal medications (Future)    Start     Dose/Rate Route Frequency Ordered Stop    18 2100  vancomycin (VANCOCIN) 1,250 mg in sodium chloride 0.9 % 250 mL intermittent infusion      1,250 mg  over 90 Minutes Intravenous EVERY 12 HOURS 18 1928      18 1445  lisinopril-hydrochlorothiazide (PRINZIDE/ZESTORETIC) 20-12.5 MG per tablet 1 tablet      1 tablet Oral DAILY 18 1440            Contrast Orders - past 72 hours     None                Plan:  1.  Start vancomycin 1250 mg IV q12h.   2.  Goal Trough Level: 10-15 mg/L   3.  Pharmacy will check trough levels as appropriate in 1-3 Days.    4. Serum creatinine levels will be ordered every other day  .    5. Brantley method utilized to dose vancomycin therapy: Method 1    Berenice Cote

## 2018-08-30 NOTE — CONSULTS
Care Transitions Team: Following for CC, discharge planning, and disposition.       Per chart review pt identified as having consultation with ID potential for IV ABX post discharge.      Benefit check in process.      Will continue to follow      Monika Lopez RN, BSN, Care Transitions Specialist   Care Transitions Team  [phone]

## 2018-08-30 NOTE — PROGRESS NOTES
"Care Transitions Team: Following for CC, discharge planning, and disposition.    Pt has [employer], will have 100% coverage for IV abx in the home.     Pt will have coverage for Home IV if needed.   Noted ID documentation for \"could range from some oral therapy here to extended IV antibiotics\"    Will continue to follow and meet with pt once route idenitified.     Monika Lopez RN, BSN, Care Transitions Specialist   Care Transitions Team  [phone]    "

## 2018-08-31 ENCOUNTER — APPOINTMENT (OUTPATIENT)
Dept: PHYSICAL THERAPY | Facility: CLINIC | Age: 48
End: 2018-08-31
Attending: ORTHOPAEDIC SURGERY
Payer: COMMERCIAL

## 2018-08-31 ENCOUNTER — HOME INFUSION (PRE-WILLOW HOME INFUSION) (OUTPATIENT)
Dept: PHARMACY | Facility: CLINIC | Age: 48
End: 2018-08-31

## 2018-08-31 VITALS
HEIGHT: 66 IN | HEART RATE: 65 BPM | DIASTOLIC BLOOD PRESSURE: 73 MMHG | RESPIRATION RATE: 16 BRPM | OXYGEN SATURATION: 98 % | BODY MASS INDEX: 27 KG/M2 | TEMPERATURE: 98.1 F | WEIGHT: 168 LBS | SYSTOLIC BLOOD PRESSURE: 142 MMHG

## 2018-08-31 LAB
BACTERIA SPEC CULT: NO GROWTH
CREAT SERPL-MCNC: 0.54 MG/DL (ref 0.52–1.04)
GFR SERPL CREATININE-BSD FRML MDRD: >90 ML/MIN/1.7M2
HGB BLD-MCNC: 12.6 G/DL (ref 11.7–15.7)
Lab: NORMAL
SPECIMEN SOURCE: NORMAL

## 2018-08-31 PROCEDURE — 99232 SBSQ HOSP IP/OBS MODERATE 35: CPT | Performed by: INTERNAL MEDICINE

## 2018-08-31 PROCEDURE — 25000128 H RX IP 250 OP 636: Performed by: ORTHOPAEDIC SURGERY

## 2018-08-31 PROCEDURE — 40000193 ZZH STATISTIC PT WARD VISIT: Performed by: PHYSICAL THERAPIST

## 2018-08-31 PROCEDURE — 25000132 ZZH RX MED GY IP 250 OP 250 PS 637: Performed by: INTERNAL MEDICINE

## 2018-08-31 PROCEDURE — 97116 GAIT TRAINING THERAPY: CPT | Mod: GP | Performed by: PHYSICAL THERAPIST

## 2018-08-31 PROCEDURE — 97530 THERAPEUTIC ACTIVITIES: CPT | Mod: GP | Performed by: PHYSICAL THERAPIST

## 2018-08-31 PROCEDURE — 85018 HEMOGLOBIN: CPT | Performed by: ORTHOPAEDIC SURGERY

## 2018-08-31 PROCEDURE — 36415 COLL VENOUS BLD VENIPUNCTURE: CPT | Performed by: ORTHOPAEDIC SURGERY

## 2018-08-31 PROCEDURE — 82565 ASSAY OF CREATININE: CPT | Performed by: ORTHOPAEDIC SURGERY

## 2018-08-31 PROCEDURE — 25000132 ZZH RX MED GY IP 250 OP 250 PS 637: Performed by: PHYSICIAN ASSISTANT

## 2018-08-31 PROCEDURE — 36569 INSJ PICC 5 YR+ W/O IMAGING: CPT

## 2018-08-31 PROCEDURE — 27210209 ZZH KIT VALVED SINGLE LUMEN

## 2018-08-31 PROCEDURE — 25000132 ZZH RX MED GY IP 250 OP 250 PS 637: Performed by: ANESTHESIOLOGY

## 2018-08-31 RX ADMIN — OXYCODONE HYDROCHLORIDE 10 MG: 5 TABLET ORAL at 15:59

## 2018-08-31 RX ADMIN — Medication 250 MG: at 09:20

## 2018-08-31 RX ADMIN — NICOTINE 1 PATCH: 21 PATCH, EXTENDED RELEASE TRANSDERMAL at 09:19

## 2018-08-31 RX ADMIN — POTASSIUM CHLORIDE 10 MEQ: 750 TABLET, FILM COATED, EXTENDED RELEASE ORAL at 09:33

## 2018-08-31 RX ADMIN — VANCOMYCIN HYDROCHLORIDE 1500 MG: 10 INJECTION, POWDER, LYOPHILIZED, FOR SOLUTION INTRAVENOUS at 12:51

## 2018-08-31 RX ADMIN — OXYCODONE HYDROCHLORIDE 10 MG: 5 TABLET ORAL at 12:59

## 2018-08-31 RX ADMIN — CITALOPRAM HYDROBROMIDE 40 MG: 20 TABLET ORAL at 09:20

## 2018-08-31 RX ADMIN — OXYCODONE HYDROCHLORIDE 10 MG: 5 TABLET ORAL at 02:46

## 2018-08-31 RX ADMIN — LEVOTHYROXINE, LIOTHYRONINE 90 MG: 19; 4.5 TABLET ORAL at 09:20

## 2018-08-31 RX ADMIN — OXYCODONE HYDROCHLORIDE 10 MG: 5 TABLET ORAL at 05:43

## 2018-08-31 RX ADMIN — OXYCODONE HYDROCHLORIDE 10 MG: 5 TABLET ORAL at 09:33

## 2018-08-31 ASSESSMENT — ACTIVITIES OF DAILY LIVING (ADL)
ADLS_ACUITY_SCORE: 11

## 2018-08-31 NOTE — PLAN OF CARE
Problem: Patient Care Overview  Goal: Plan of Care/Patient Progress Review    PT:     Discharge Planner PT   Patient plan for discharge: home with spouse  Current status: Pt clarified with MD, is foot flat wt bearing. Pt tolerating well. Pt was cued for use of FWW for short tight spaces in home. Pt also educated on 3 steps. Able to perform with CGA and cues.   Barriers to return to prior living situation: none met goals  Recommendations for discharge: home with family support/assistance, knee scooter and crutches  Rationale for recommendations: Anticipate patient will progress well given PLOF and current functional status.  Will benefit from ongoing skilled PT intervention to improve mobility status prior to returning home.         Entered by: Cecilia Ayala 08/31/2018 11:20 AM       Physical Therapy Discharge Summary    Reason for therapy discharge:    All goals and outcomes met, no further needs identified.    Progress towards therapy goal(s). See goals on Care Plan in [facility] electronic health record for goal details.  Goals met    Therapy recommendation(s):    Continue home exercise program.

## 2018-08-31 NOTE — PLAN OF CARE
Problem: Patient Care Overview  Goal: Plan of Care/Patient Progress Review  Pt alert and oriented. Up with SBA, walker/gait belt. WBAT to LLE. Ace wrap CDI. CMS intact .Pain controlled with oxycodone 10mg q3h PRN. Pt continues on vancomycin.  Bowels active, passing flatus. Adequate appetite. Held norvasc due to parameter. VSS. Pt hopes to discharge to home tomorrow.

## 2018-08-31 NOTE — PROGRESS NOTES
Care Transitions Team: Following for CC, discharge planning, and disposition.        Understanding unclear Abx route at this time, update was provided to   [facility]. Pt is a resident of [place], [place]     If pt is to need Home IV abx [facility] has requested orders placed by 4pm today in order to get the medication in time and ability to service the pt at home prior to 8pm, which is the latest they can do a home visit.      This was shared with pt. Pt  feels confident that he would be able to assist with Home IV if needed.      Explained best case scenerio, if IV needs, to have DC orders and a PICC by 4pm other wise pt will need to stay over night and be discharged tomorrow with SWS following up to solidify plan with [facility] in AM.

## 2018-08-31 NOTE — PLAN OF CARE
Problem: Patient Care Overview  Goal: Plan of Care/Patient Progress Review  Outcome: Improving  A&O x4. VSS. LS CTA all fields. BS active x4. Dressing to LLE is CDI, CMS  Intact. On IV vanco for infection. brady PO well.  up with SBA and walker with  FFWB. PO oxycodone and ativan managing pain. voiding in good amts. ID following for appropriate discharge antibiotic.Will continue to monitor.

## 2018-08-31 NOTE — PROGRESS NOTES
[facility]  Hospitalist Progress Note  Name: Genoveva Harrell    MRN: [id number]  YOB: 1970    Age: 48 year old  Date of admission: 8/29/2018  Primary care provider: Doug, Jorge Alberto Dutton      Reason for Stay (Diagnosis): S/p L foot removal of hardware, I&D down to bone on 1st MTP w/ application of antibiotic beads, and debridement of 5th metatarsal head callus         Assessment and Plan:      Summary of Stay:  This patient is a 48 year old female with a PMH significant for HTN, hypothyroidism, KAR, who is postop s/p infected hardware removal from left foot (left 1st ray, 1st MTP joint), excisional debridement on the first MTP joint w/ application of antibiotic vancomycin impregnated beads, and debridement of callus/ulceration of skin over the left 5th metatarsal head.     1. S/p L foot removal of hardware, I&D down to bone on 1st MTP w/ application of antibiotic beads, and debridement of 5th metatarsal head callus: Postop day #2.  Doing well, cont PT/OT and pain control as per primary.  ID input appreciated.  Await cultures and sensitivities.  Continue vancomycin for now.  DVT Prophylaxis: on PCDs as per primary.     2. Hypertension: Resumed PTA lisinopril-hydrochlorothiazide 20-12.5 mg daily (with cocurrent) potassium replacement) and amlodipine 10 mg daily w/ hold parameters.     3. Hypothyroidism: Resumed PTA [place] thyroid 90 mg daily.     4. Generalized anxiety disorder: Resumed PTA citalopram 40 mg daily.     5. Tobacco dependence: Patient is a daily 1 ppd smoker for approximately 30 years. Continue nicotine patch 21 mg/24 hr.    DVT Prophylaxis: Defer to primary service  Code Status: Full Code  Discharge Dispo: Per orthopedics  Estimated Disch Date / # of Days until Disch: Per orthopedics pain control.          Interval History (Subjective):      Pain controlled.  No other specific complaints.         Physical Exam:      Vital signs:  Temp: 98.1  F (36.7  C) Temp  "src: Oral BP: 142/73 Pulse: 65 Heart Rate: 73 Resp: 16 SpO2: 98 % O2 Device: None (Room air) Oxygen Delivery: 2 LPM Height: 167.6 cm (5' 6\") Weight: 76.2 kg (168 lb)  Estimated body mass index is 27.12 kg/(m^2) as calculated from the following:    Height as of this encounter: 1.676 m (5' 6\").    Weight as of this encounter: 76.2 kg (168 lb).    I/O last 3 completed shifts:  In: 2170 [P.O.:2170]  Out: -   Vitals:    08/29/18 0600   Weight: 76.2 kg (168 lb)       Constitutional: Awake, alert, cooperative, no apparent distress   Respiratory: Nl work of breathing. Clear to auscultation bilaterally, no crackles or wheezing   Cardiovascular: Regular rate and rhythm, normal S1 and S2, and no murmur noted       Skin: No rashes, no cyanosis, dry to touch   Neuro: CN 2-12 intact, no localizing weakness   Extremities: No edema, normal range of motion   HEENT Normocephalic, atraumatic, normal nasal turbinates; oropharynx clear   Neck Supple; nl inspection; trachea midline; no thryomegaly   Psychiatric: A+O x3. Normal affect          Medications:      All current medications were reviewed with changes reflected in problem list.         Data:      All new lab and imaging data was reviewed.   Labs:    Recent Labs  Lab 08/29/18  0833 08/29/18  0831   CULT Culture in progress  On day 2, isolated in broth only:Gram positive cocci*  Culture negative monitoring continues No growth  Culture negative monitoring continues       Recent Labs  Lab 08/31/18  0552 08/30/18  0606 08/29/18  1525   WBC  --  10.7 9.8   HGB 12.6 13.2 13.9   HCT  --  39.9 41.3   MCV  --  92 91   PLT  --  277 295       Recent Labs  Lab 08/31/18  0552 08/30/18  0606 08/29/18  1525   GLC  --  107*  --    CR 0.54  --  0.51*   GFRESTIMATED >90  --  >90   GFRESTBLACK >90  --  >90      Imaging:   No results found for this or any previous visit (from the past 24 hour(s)).    Camila York [phone]      "

## 2018-08-31 NOTE — PROGRESS NOTES
"[facility]  Infectious Disease Progress Note          Assessment and Plan:   IMPRESSION:  A 48-year-old healthy female with repeated malunions after left foot bunion and hammertoe surgeries, came for operative intervention to repair, surprise finding preop of some drainage medially and evidence at surgery of deep infection, unclear whether major bone involvement, inflammatory markers normal, only recent onset of drainage and no systemic symptoms.  Gram stain positive for gram-positive cocci and culture pending.       RECOMMENDATIONS:   1.  Await culture , vancomycin for now.     2.  Note Dr Rodrigues , cx pending for now PICC and home vanco, I will Follow-up on cx and discuss eventual duration with Dr Rodrigues, orders in         Interval History:   no new complaints and doing well; no cp, sob, n/v/d, or abd pain. cx staph, no final              Medications:       amLODIPine (NORVASC) tablet 10 mg  10 mg Oral QPM     citalopram (celeXA) tablet 40 mg  40 mg Oral Daily     lisinopril-hydrochlorothiazide  1 tablet Oral Daily     nicotine  1 patch Transdermal Daily     nicotine   Transdermal Q8H     nicotine   Transdermal Daily     potassium chloride  10 mEq Oral Once per day on Mon Wed Fri     saccharomyces boulardii  250 mg Oral BID     sodium chloride (PF)  3 mL Intracatheter Q8H     thyroid  90 mg Oral Daily     vancomycin (VANCOCIN) IV  1,500 mg Intravenous Q12H                  Physical Exam:   Blood pressure 142/73, pulse 65, temperature 98.1  F (36.7  C), temperature source Oral, resp. rate 18, height 1.676 m (5' 6\"), weight 76.2 kg (168 lb), SpO2 98 %.  Wt Readings from Last 2 Encounters:   08/29/18 76.2 kg (168 lb)     Vital Signs with Ranges  Temp:  [98.1  F (36.7  C)-98.5  F (36.9  C)] 98.1  F (36.7  C)  Pulse:  [65] 65  Heart Rate:  [60-81] 73  Resp:  [16-20] 18  BP: (102-142)/(62-73) 142/73  SpO2:  [97 %-98 %] 98 %    Constitutional: Awake, alert, cooperative, no apparent distress "   Lungs: Clear to auscultation bilaterally, no crackles or wheezing   Cardiovascular: Regular rate and rhythm, normal S1 and S2, and no murmur noted   Abdomen: Normal bowel sounds, soft, non-distended, non-tender   Skin: No rashes, no cyanosis, no edema   Other:           Data:   All microbiology laboratory data reviewed.  Recent Labs   Lab Test  08/31/18   0552  08/30/18   0606  08/29/18   1525   WBC   --   10.7  9.8   HGB  12.6  13.2  13.9   HCT   --   39.9  41.3   MCV   --   92  91   PLT   --   277  295     Recent Labs   Lab Test  08/31/18   0552  08/29/18   1525   CR  0.54  0.51*     Recent Labs   Lab Test  08/30/18   0606   SED  8     Recent Labs   Lab Test  08/29/18   0833  08/29/18   0831   CULT  Culture in progress  On day 2, isolated in broth only:  Gram positive cocci  *  Culture negative monitoring continues  No growth  Culture negative monitoring continues

## 2018-08-31 NOTE — PROGRESS NOTES
"Care Transitions Team: Following for CC, discharge planning, and disposition.      Per chart review pt will be discharged for today.   Pt and  are adamant about discharging today.   Next IV dose is due at 0200.   And because it is after 4pm [facility] will not be able to service pt this evening prior to her 0200 am dose.      is upset and says\" this is ridiculous and that they are going to leave then,     Collaboration with Dr. Ward regarding possibility to start IV abx at home 9/1 and Dr. Ward did verbally ok this first home visit for 9/1.     This update has been provided to pt along with [facility] pamphlet.       [facility] will be in touch with pt to schedueled a home visit for 9/1 Am.      Monika Lopez RN, BSN, Care Transitions Specialist   Care Transitions Team  [phone]          "

## 2018-08-31 NOTE — PLAN OF CARE
Problem: Patient Care Overview  Goal: Plan of Care/Patient Progress Review  Outcome: Improving  Pt had picc line placed by Vascular access team, same ok to use per RN.PT discharged to home, discharge instructions reviewed with pt and spouse, pain med oxycodone given.[facility] RN called , will follow up with pt in am for home abx.Dressing intact to left  Foot.Pt will follow up with Dr Rodrigues.Left unit via wheelchair accompanied by NA and family.Crutches and personal belongings taken by pt.

## 2018-08-31 NOTE — PLAN OF CARE
Problem: Patient Care Overview  Goal: Plan of Care/Patient Progress Review  Outcome: Improving  Pt A/O, up with A1 and walker. VSS. Pain is 7/10 in left foot. Oxycodone given x 2. Appetite good.Drinking and voiding adequate amounts. Saline locked.CMS intact. On IV vancomycin, adjusted times due to loss of IV access. New IV placed. Plan to discharge home today. Okay per surgery and hospitalist to discharge.  Waiting on ID to determine antibiotic regiment for home.  Dr. Rodrigues would like to be texted on his cell phone if patient is not discharging tonight so he can tell his  PA to round on her tomorrow, Number is [phone]. Will continue to monitor.

## 2018-08-31 NOTE — DISCHARGE INSTRUCTIONS
Post Operative Instructions for DR. SHAHAB DUNN M.D.  [facility]    DIET and NUTRITIONAL SUPPLEMENTATION:  1. Begin with liquids and light foods (jello, soups, etc).  Rehydration is important the first few days after surgery.  Gradually resume a normal diet; avoiding foods with a label.   2. To aid in both surgical wound and bone healing here are several recommendations:  ?    Eat 3 or more servings per day of foods high in Protein and Calories.              ? Examples include: fish, poultry, meat, pork, nuts, and dairy products  ?    Take Calcium 1500 mg per day from a combination of dairy products and Calcium supplements  ?    Take Vitamin C 500 mg 3 times per day (total of 1500 mg) or eat 5 servings of citrus fruits   ?    Take Zinc 50 mg 2 times per day (total of 100 mg)  ?    Take Betacarotene 25,000 IU each day  3. It is best not to smoke, use tobacco, or nicotine patches during your healing period.    FOR 24 HOURS FOLLOWING SURGERY:  1. Be in the care of a responsible adult.  2. Do not drive or operate any machinery.  3. Do not make important personal or business decisions or sign legal documents.  4. Avoid alcoholic beverages.    MEDICATIONS:  1. Strong oral pain medication has been prescribed for the first few days. Use only as directed.  2. Do not drink alcoholic beverages while using this medication.  3. When taking pain medication, be careful as you walk or climb stairs. Mild dizziness is not unusual.  4. Continue to take your routine medications as prescribed by your internist/family practitioner. Please ask if you are unsure about continuing these medications after surgery.  5. Do not take any medications that have not been prescribed by your physician.    ACTIVITIES:  1. While seated or lying, keep the operated limb at the level of your heart, toes at the level of your nose, for 23 hours per day for three full days after surgery.  This is the time when the most swelling occurs while  the wound attempts to seal.   2. Over the next 10 days if the limb begins to throb, you may have been up for too long or been trying to do too much. Take a break and elevate your leg.   3. Place ice packs over the operative site on the cast or dressing.  Use for 20 minutes every 2-3 hours while you are awake-it will help alleviate pain and swelling.  4. Return to work:  timing depends on your type of employment.    WOUND CARE:   1. Unless you have been instructed by Dr. Rodrigues, do not remove your cast or dressings.  2. To help keep your dressing clean and dry use a waterproof cast sock (Dry-pro)                  [employer] or a plastic bag and duct tape for bathing.  3. Do not place foreign objects into your cast or dressing even if you are itchy-they can get stuck and cause ulcers and infections.  Use a hairdryer if skin becomes moist from sweat.    WHEN TO CALL YOUR PHYSICIAN:  1. While a low-grade fever in the first 24-48 post-operative hours is not unusual, a temperature above 101.50F, an elevation in temperature more than 3 days after surgery, or associated with chills, merits evaluation.   2. Increasing or a change in type of pain, swelling, numbness, pain with constant toe stretching.  3. Continuous drainage or bleeding from the incision site. A small amount is expected.  4. Any other worrisome condition.    FOLLOW-UP CARE:  If you have not scheduled a follow-up appointment, please call my  at [phone] or email her at   [email or web address] or through our patient portal.  To improve your overall experience routine care dressing or cast changes may be scheduled with an orthopedic assistant.      Follow up appointment_______________________________________________________________________    Nurse signature____________________________________________Date________________Time_________    Patient  signature___________________________________________Dare________________Time_________      Additional information: IF taking pain medications  You may have been prescribed a multi-modal pain medication regimen.  Inform your physician of any drug allergies.  Longer-Acting Narcotic (example MS contin)                 Directions:  take 1 tablet by mouth every 12 hours only if needed for pain.  Long-Acting Anti-Inflammatory (example ibuprofen, motrin)                 Directions:  take 1 tablet by mouth every 8 hours for the next five days and then only if needed for pain.  Short-Acting Narcotic (example oxycodone immediate release, dilaudid)                 Directions:  take prescribed amount by mouth every 3 hours as needed for breakthrough pain.    What is breakthrough pain?  Breakthrough pain is pain that is NOT controlled by the longer-acting pain pill.    Directions for Use:  1. Start taking the longer-acting pain medication at bedtime on the day of your surgery; regardless of if you   have pain.  Since this medication is taken every 12 hours, you don t want to wait until the middle of the night to start it, or you will continue to have to take it in the middle of the night to stay on schedule.      2. DO NOT CRUSH, CHEW OR DISSOLVE THE TABLET.    3. If you have uncontrolled pain (breakthrough) during the 12 hours, take the short acting pain pill as prescribed.    4. Some patients need to take both the longer acting and short acting pain pills for the first few days to control their pain.    5. ALWAYS take with food (soup, jello, fruit or nuts).    6. Pain pills can cause constipation.  Start taking the Miralax one packet or capful daily the morning after surgery.  Continue until you stop using narcotics.    7. If the pain pill causes nausea, take the medication for nausea (if prescribed).  Wait for one hour after taking the anti-nausea medication before you resume your pain pills.    8. If you have itching, take  over-the-counter diphenhydramine (Benadryl) as directed on the label.  If you have a rash or hives, do not resume your pain medication until an allergic reaction is ruled out.  Call physician for further instructions.    9. STOP TAKING THE MEDICATIONS AND CALL YOUR DOCTOR IF:       ?  You have uncontrolled nausea and or vomiting     ?  You have a rash or hives     ?   IF YOU HAVE TROUBLE BREATHING OR SWALLOWING, CALL [phone]     [facility] will  Be in touch with you 9/1/18 in am regarding your iv antibiotic therapy.  Printed instructions were given to pt on care and observation of picc line.        GENERAL ANESTHESIA OR SEDATION ADULT DISCHARGE INSTRUCTIONS   SPECIAL PRECAUTIONS FOR 24 HOURS AFTER SURGERY    IT IS NOT UNUSUAL TO FEEL LIGHT-HEADED OR FAINT, UP TO 24 HOURS AFTER SURGERY OR WHILE TAKING PAIN MEDICATION.  IF YOU HAVE THESE SYMPTOMS; SIT FOR A FEW MINUTES BEFORE STANDING AND HAVE SOMEONE ASSIST YOU WHEN YOU GET UP TO WALK OR USE THE BATHROOM.    YOU SHOULD REST AND RELAX FOR THE NEXT 24 HOURS AND YOU MUST MAKE ARRANGEMENTS TO HAVE SOMEONE STAY WITH YOU FOR AT LEAST 24 HOURS AFTER YOUR DISCHARGE.  AVOID HAZARDOUS AND STRENUOUS ACTIVITIES.  DO NOT MAKE IMPORTANT DECISIONS FOR 24 HOURS.    DO NOT DRIVE ANY VEHICLE OR OPERATE MECHANICAL EQUIPMENT FOR 24 HOURS FOLLOWING THE END OF YOUR SURGERY.  EVEN THOUGH YOU MAY FEEL NORMAL, YOUR REACTIONS MAY BE AFFECTED BY THE MEDICATION YOU HAVE RECEIVED.    DO NOT DRINK ALCOHOLIC BEVERAGES FOR 24 HOURS FOLLOWING YOUR SURGERY.    DRINK CLEAR LIQUIDS (APPLE JUICE, GINGER ALE, 7-UP, BROTH, ETC.).  PROGRESS TO YOUR REGULAR DIET AS YOU FEEL ABLE.    YOU MAY HAVE A DRY MOUTH, A SORE THROAT, MUSCLES ACHES OR TROUBLE SLEEPING.  THESE SHOULD GO AWAY AFTER 24 HOURS.    CALL YOUR DOCTOR FOR ANY OF THE FOLLOWING:  SIGNS OF INFECTION (FEVER, GROWING TENDERNESS AT THE SURGERY SITE, A LARGE AMOUNT OF DRAINAGE OR BLEEDING, SEVERE PAIN, FOUL-SMELLING DRAINAGE, REDNESS OR  SWELLING.    IT HAS BEEN OVER 8 TO 10 HOURS SINCE SURGERY AND YOU ARE STILL NOT ABLE TO URINATE (PASS WATER).

## 2018-09-01 ENCOUNTER — HOME INFUSION (PRE-WILLOW HOME INFUSION) (OUTPATIENT)
Dept: PHARMACY | Facility: CLINIC | Age: 48
End: 2018-09-01

## 2018-09-02 LAB
BACTERIA SPEC CULT: ABNORMAL
SPECIMEN SOURCE: ABNORMAL

## 2018-09-03 ENCOUNTER — HOME INFUSION (PRE-WILLOW HOME INFUSION) (OUTPATIENT)
Dept: PHARMACY | Facility: CLINIC | Age: 48
End: 2018-09-03

## 2018-09-03 LAB
BASOPHILS # BLD AUTO: 0.1 10E9/L (ref 0–0.2)
BASOPHILS NFR BLD AUTO: 0.6 %
BUN SERPL-MCNC: 9 MG/DL (ref 7–30)
CREAT SERPL-MCNC: 0.61 MG/DL (ref 0.52–1.04)
CRP SERPL-MCNC: 11.9 MG/L (ref 0–8)
DIFFERENTIAL METHOD BLD: NORMAL
EOSINOPHIL # BLD AUTO: 0.6 10E9/L (ref 0–0.7)
EOSINOPHIL NFR BLD AUTO: 7 %
ERYTHROCYTE [DISTWIDTH] IN BLOOD BY AUTOMATED COUNT: 14 % (ref 10–15)
ERYTHROCYTE [SEDIMENTATION RATE] IN BLOOD BY WESTERGREN METHOD: 8 MM/H (ref 0–20)
GFR SERPL CREATININE-BSD FRML MDRD: >90 ML/MIN/1.7M2
HCT VFR BLD AUTO: 42.3 % (ref 35–47)
HGB BLD-MCNC: 13.6 G/DL (ref 11.7–15.7)
IMM GRANULOCYTES # BLD: 0 10E9/L (ref 0–0.4)
IMM GRANULOCYTES NFR BLD: 0.1 %
LYMPHOCYTES # BLD AUTO: 3.2 10E9/L (ref 0.8–5.3)
LYMPHOCYTES NFR BLD AUTO: 39.1 %
MCH RBC QN AUTO: 30 PG (ref 26.5–33)
MCHC RBC AUTO-ENTMCNC: 32.2 G/DL (ref 31.5–36.5)
MCV RBC AUTO: 93 FL (ref 78–100)
MONOCYTES # BLD AUTO: 0.4 10E9/L (ref 0–1.3)
MONOCYTES NFR BLD AUTO: 5.3 %
NEUTROPHILS # BLD AUTO: 3.9 10E9/L (ref 1.6–8.3)
NEUTROPHILS NFR BLD AUTO: 47.9 %
NRBC # BLD AUTO: 0 10*3/UL
NRBC BLD AUTO-RTO: 0 /100
PLATELET # BLD AUTO: 269 10E9/L (ref 150–450)
RBC # BLD AUTO: 4.54 10E12/L (ref 3.8–5.2)
VANCOMYCIN SERPL-MCNC: 11.9 MG/L
WBC # BLD AUTO: 8.2 10E9/L (ref 4–11)

## 2018-09-03 PROCEDURE — 84520 ASSAY OF UREA NITROGEN: CPT | Performed by: INTERNAL MEDICINE

## 2018-09-03 PROCEDURE — 82565 ASSAY OF CREATININE: CPT | Performed by: INTERNAL MEDICINE

## 2018-09-03 PROCEDURE — 85652 RBC SED RATE AUTOMATED: CPT | Performed by: INTERNAL MEDICINE

## 2018-09-03 PROCEDURE — 86140 C-REACTIVE PROTEIN: CPT | Performed by: INTERNAL MEDICINE

## 2018-09-03 PROCEDURE — 85025 COMPLETE CBC W/AUTO DIFF WBC: CPT | Performed by: INTERNAL MEDICINE

## 2018-09-03 PROCEDURE — 80202 ASSAY OF VANCOMYCIN: CPT | Performed by: INTERNAL MEDICINE

## 2018-09-04 ENCOUNTER — HOME INFUSION (PRE-WILLOW HOME INFUSION) (OUTPATIENT)
Dept: PHARMACY | Facility: CLINIC | Age: 48
End: 2018-09-04

## 2018-09-04 NOTE — DISCHARGE SUMMARY
[facility]  DISCHARGE SUMMARY  Admission Date:8/29/2018   Discharge Date:8/31/2018   Procedures Performed:Procedure(s) (LRB):  REMOVE HARDWARE FOOT (Left)     Hospital Course:   Admitted for infection treatment of hardware  ID and Hospitalist consult obtained  Please see chart for details    Discharge Instructions: Explicit discharge instructions were given and expected follow up was recommended.     SHAHAB DUNN MD  [email or web address]  [email or web address]  [phone]  [facility]

## 2018-09-04 NOTE — PROGRESS NOTES
This is a recent snapshot of the patient's [facility] medical record.  For current drug dose and complete information and questions, call [phone] or In Basket pool, [facility] home infusion ([id number])  CSN Number:  [id number]

## 2018-09-05 LAB
BACTERIA SPEC CULT: ABNORMAL
Lab: ABNORMAL
Lab: ABNORMAL
SPECIMEN SOURCE: ABNORMAL
SPECIMEN SOURCE: ABNORMAL

## 2018-09-05 NOTE — PROGRESS NOTES
This is a recent snapshot of the patient's [facility] medical record.  For current drug dose and complete information and questions, call [phone] or In Basket pool, [facility] home infusion ([id number])  CSN Number:  [id number]

## 2018-09-05 NOTE — PROGRESS NOTES
This is a recent snapshot of the patient's [facility] medical record.  For current drug dose and complete information and questions, call [phone] or In Basket pool, [facility] home infusion ([id number])  CSN Number:  [id number]

## 2018-09-05 NOTE — PROGRESS NOTES
This is a recent snapshot of the patient's [facility] medical record.  For current drug dose and complete information and questions, call [phone] or In Basket pool, fv home infusion ([id number])  CSN Number:  [id number]

## 2018-09-07 ENCOUNTER — HOME INFUSION (PRE-WILLOW HOME INFUSION) (OUTPATIENT)
Dept: PHARMACY | Facility: CLINIC | Age: 48
End: 2018-09-07

## 2018-09-07 ENCOUNTER — HOSPITAL ENCOUNTER (OUTPATIENT)
Facility: CLINIC | Age: 48
End: 2018-09-07
Attending: ORTHOPAEDIC SURGERY | Admitting: ORTHOPAEDIC SURGERY
Payer: COMMERCIAL

## 2018-09-07 LAB
CREAT SERPL-MCNC: 0.58 MG/DL (ref 0.52–1.04)
GFR SERPL CREATININE-BSD FRML MDRD: >90 ML/MIN/1.7M2
VANCOMYCIN SERPL-MCNC: 13.2 MG/L

## 2018-09-07 PROCEDURE — 80202 ASSAY OF VANCOMYCIN: CPT | Performed by: INTERNAL MEDICINE

## 2018-09-07 PROCEDURE — 82565 ASSAY OF CREATININE: CPT | Performed by: INTERNAL MEDICINE

## 2018-09-10 ENCOUNTER — HOME INFUSION (PRE-WILLOW HOME INFUSION) (OUTPATIENT)
Dept: PHARMACY | Facility: CLINIC | Age: 48
End: 2018-09-10

## 2018-09-10 LAB
BASOPHILS # BLD AUTO: 0.1 10E9/L (ref 0–0.2)
BASOPHILS NFR BLD AUTO: 0.3 %
BUN SERPL-MCNC: 9 MG/DL (ref 7–30)
CREAT SERPL-MCNC: 0.75 MG/DL (ref 0.52–1.04)
CRP SERPL-MCNC: 19.8 MG/L (ref 0–8)
DIFFERENTIAL METHOD BLD: ABNORMAL
EOSINOPHIL # BLD AUTO: 0.3 10E9/L (ref 0–0.7)
EOSINOPHIL NFR BLD AUTO: 2.2 %
ERYTHROCYTE [DISTWIDTH] IN BLOOD BY AUTOMATED COUNT: 14.3 % (ref 10–15)
ERYTHROCYTE [SEDIMENTATION RATE] IN BLOOD BY WESTERGREN METHOD: 8 MM/H (ref 0–20)
GFR SERPL CREATININE-BSD FRML MDRD: 82 ML/MIN/1.7M2
HCT VFR BLD AUTO: 42.3 % (ref 35–47)
HGB BLD-MCNC: 14 G/DL (ref 11.7–15.7)
IMM GRANULOCYTES # BLD: 0 10E9/L (ref 0–0.4)
IMM GRANULOCYTES NFR BLD: 0.3 %
LYMPHOCYTES # BLD AUTO: 2 10E9/L (ref 0.8–5.3)
LYMPHOCYTES NFR BLD AUTO: 12.6 %
MCH RBC QN AUTO: 30.7 PG (ref 26.5–33)
MCHC RBC AUTO-ENTMCNC: 33.1 G/DL (ref 31.5–36.5)
MCV RBC AUTO: 93 FL (ref 78–100)
MONOCYTES # BLD AUTO: 0.9 10E9/L (ref 0–1.3)
MONOCYTES NFR BLD AUTO: 5.9 %
NEUTROPHILS # BLD AUTO: 12.3 10E9/L (ref 1.6–8.3)
NEUTROPHILS NFR BLD AUTO: 78.7 %
NRBC # BLD AUTO: 0 10*3/UL
NRBC BLD AUTO-RTO: 0 /100
PLATELET # BLD AUTO: 243 10E9/L (ref 150–450)
RBC # BLD AUTO: 4.56 10E12/L (ref 3.8–5.2)
VANCOMYCIN SERPL-MCNC: 17 MG/L
WBC # BLD AUTO: 15.7 10E9/L (ref 4–11)

## 2018-09-10 PROCEDURE — 82565 ASSAY OF CREATININE: CPT | Performed by: INTERNAL MEDICINE

## 2018-09-10 PROCEDURE — 86140 C-REACTIVE PROTEIN: CPT | Performed by: INTERNAL MEDICINE

## 2018-09-10 PROCEDURE — 85652 RBC SED RATE AUTOMATED: CPT | Performed by: INTERNAL MEDICINE

## 2018-09-10 PROCEDURE — 80202 ASSAY OF VANCOMYCIN: CPT | Performed by: INTERNAL MEDICINE

## 2018-09-10 PROCEDURE — 84520 ASSAY OF UREA NITROGEN: CPT | Performed by: INTERNAL MEDICINE

## 2018-09-10 PROCEDURE — 85025 COMPLETE CBC W/AUTO DIFF WBC: CPT | Performed by: INTERNAL MEDICINE

## 2018-09-10 NOTE — PROGRESS NOTES
This is a recent snapshot of the patient's [facility] medical record.  For current drug dose and complete information and questions, call [phone] or In Basket pool, fv home infusion ([id number])  CSN Number:  [id number]

## 2018-09-11 NOTE — PROGRESS NOTES
This is a recent snapshot of the patient's [facility] medical record.  For current drug dose and complete information and questions, call [phone] or In Basket pool, fv home infusion ([id number])  CSN Number:  [id number]

## 2018-09-12 ENCOUNTER — HOME INFUSION (PRE-WILLOW HOME INFUSION) (OUTPATIENT)
Dept: PHARMACY | Facility: CLINIC | Age: 48
End: 2018-09-12

## 2018-09-12 LAB
BASOPHILS # BLD AUTO: 0 10E9/L (ref 0–0.2)
BASOPHILS NFR BLD AUTO: 0.4 %
CRP SERPL-MCNC: 56.1 MG/L (ref 0–8)
DIFFERENTIAL METHOD BLD: NORMAL
EOSINOPHIL # BLD AUTO: 0.4 10E9/L (ref 0–0.7)
EOSINOPHIL NFR BLD AUTO: 4 %
ERYTHROCYTE [DISTWIDTH] IN BLOOD BY AUTOMATED COUNT: 14.1 % (ref 10–15)
HCT VFR BLD AUTO: 38.9 % (ref 35–47)
HGB BLD-MCNC: 12.7 G/DL (ref 11.7–15.7)
IMM GRANULOCYTES # BLD: 0 10E9/L (ref 0–0.4)
IMM GRANULOCYTES NFR BLD: 0.2 %
LYMPHOCYTES # BLD AUTO: 2.5 10E9/L (ref 0.8–5.3)
LYMPHOCYTES NFR BLD AUTO: 24.5 %
MCH RBC QN AUTO: 30.5 PG (ref 26.5–33)
MCHC RBC AUTO-ENTMCNC: 32.6 G/DL (ref 31.5–36.5)
MCV RBC AUTO: 93 FL (ref 78–100)
MONOCYTES # BLD AUTO: 0.4 10E9/L (ref 0–1.3)
MONOCYTES NFR BLD AUTO: 3.7 %
NEUTROPHILS # BLD AUTO: 6.9 10E9/L (ref 1.6–8.3)
NEUTROPHILS NFR BLD AUTO: 67.2 %
NRBC # BLD AUTO: 0 10*3/UL
NRBC BLD AUTO-RTO: 0 /100
PLATELET # BLD AUTO: 225 10E9/L (ref 150–450)
RBC # BLD AUTO: 4.17 10E12/L (ref 3.8–5.2)
WBC # BLD AUTO: 10.3 10E9/L (ref 4–11)

## 2018-09-12 PROCEDURE — 85025 COMPLETE CBC W/AUTO DIFF WBC: CPT | Performed by: INTERNAL MEDICINE

## 2018-09-12 PROCEDURE — 86140 C-REACTIVE PROTEIN: CPT | Performed by: INTERNAL MEDICINE

## 2018-09-13 ENCOUNTER — HOME INFUSION (PRE-WILLOW HOME INFUSION) (OUTPATIENT)
Dept: PHARMACY | Facility: CLINIC | Age: 48
End: 2018-09-13

## 2018-09-13 NOTE — PROGRESS NOTES
This is a recent snapshot of the patient's [facility] medical record.  For current drug dose and complete information and questions, call [phone] or In Basket pool, fv home infusion ([id number])  CSN Number:  [id number]

## 2018-09-14 ENCOUNTER — HOME INFUSION (PRE-WILLOW HOME INFUSION) (OUTPATIENT)
Dept: PHARMACY | Facility: CLINIC | Age: 48
End: 2018-09-14

## 2018-09-14 NOTE — PROGRESS NOTES
This is a recent snapshot of the patient's [facility] medical record.  For current drug dose and complete information and questions, call [phone] or In [facility] pool, fv home infusion ([id number])  CSN Number:  [id number]

## 2018-09-15 ENCOUNTER — HOME INFUSION (PRE-WILLOW HOME INFUSION) (OUTPATIENT)
Dept: PHARMACY | Facility: CLINIC | Age: 48
End: 2018-09-15

## 2018-09-17 ENCOUNTER — HOME INFUSION (PRE-WILLOW HOME INFUSION) (OUTPATIENT)
Dept: PHARMACY | Facility: CLINIC | Age: 48
End: 2018-09-17

## 2018-09-17 LAB
BASOPHILS # BLD AUTO: 0.1 10E9/L (ref 0–0.2)
BASOPHILS NFR BLD AUTO: 0.5 %
BUN SERPL-MCNC: 7 MG/DL (ref 7–30)
CREAT SERPL-MCNC: 0.86 MG/DL (ref 0.52–1.04)
CRP SERPL-MCNC: 12.7 MG/L (ref 0–8)
DIFFERENTIAL METHOD BLD: ABNORMAL
EOSINOPHIL # BLD AUTO: 0.3 10E9/L (ref 0–0.7)
EOSINOPHIL NFR BLD AUTO: 2 %
ERYTHROCYTE [DISTWIDTH] IN BLOOD BY AUTOMATED COUNT: 13.9 % (ref 10–15)
ERYTHROCYTE [SEDIMENTATION RATE] IN BLOOD BY WESTERGREN METHOD: 13 MM/H (ref 0–20)
GFR SERPL CREATININE-BSD FRML MDRD: 70 ML/MIN/1.7M2
HCT VFR BLD AUTO: 43.6 % (ref 35–47)
HGB BLD-MCNC: 14.3 G/DL (ref 11.7–15.7)
IMM GRANULOCYTES # BLD: 0 10E9/L (ref 0–0.4)
IMM GRANULOCYTES NFR BLD: 0.3 %
LYMPHOCYTES # BLD AUTO: 2.3 10E9/L (ref 0.8–5.3)
LYMPHOCYTES NFR BLD AUTO: 17.3 %
MCH RBC QN AUTO: 30.6 PG (ref 26.5–33)
MCHC RBC AUTO-ENTMCNC: 32.8 G/DL (ref 31.5–36.5)
MCV RBC AUTO: 93 FL (ref 78–100)
MONOCYTES # BLD AUTO: 0.7 10E9/L (ref 0–1.3)
MONOCYTES NFR BLD AUTO: 5 %
NEUTROPHILS # BLD AUTO: 10 10E9/L (ref 1.6–8.3)
NEUTROPHILS NFR BLD AUTO: 74.9 %
NRBC # BLD AUTO: 0 10*3/UL
NRBC BLD AUTO-RTO: 0 /100
PLATELET # BLD AUTO: 333 10E9/L (ref 150–450)
RBC # BLD AUTO: 4.68 10E12/L (ref 3.8–5.2)
WBC # BLD AUTO: 13.3 10E9/L (ref 4–11)

## 2018-09-17 PROCEDURE — 86140 C-REACTIVE PROTEIN: CPT | Performed by: INTERNAL MEDICINE

## 2018-09-17 PROCEDURE — 85652 RBC SED RATE AUTOMATED: CPT | Performed by: INTERNAL MEDICINE

## 2018-09-17 PROCEDURE — 82565 ASSAY OF CREATININE: CPT | Performed by: INTERNAL MEDICINE

## 2018-09-17 PROCEDURE — 85025 COMPLETE CBC W/AUTO DIFF WBC: CPT | Performed by: INTERNAL MEDICINE

## 2018-09-17 PROCEDURE — 84520 ASSAY OF UREA NITROGEN: CPT | Performed by: INTERNAL MEDICINE

## 2018-09-17 NOTE — PROGRESS NOTES
This is a recent snapshot of the patient's [facility] medical record.  For current drug dose and complete information and questions, call [phone] or In Basket pool, fv home infusion ([id number])  CSN Number:  [id number]

## 2018-09-17 NOTE — PROGRESS NOTES
This is a recent snapshot of the patient's [facility] medical record.  For current drug dose and complete information and questions, call [phone] or In Basket pool, fv home infusion ([id number])  CSN Number:  [id number]

## 2018-09-18 NOTE — PROGRESS NOTES
This is a recent snapshot of the patient's [facility] medical record.  For current drug dose and complete information and questions, call [phone] or In Basket pool, fv home infusion ([id number])  CSN Number:  [id number]

## 2018-09-19 ENCOUNTER — HOME INFUSION (PRE-WILLOW HOME INFUSION) (OUTPATIENT)
Dept: PHARMACY | Facility: CLINIC | Age: 48
End: 2018-09-19

## 2018-09-20 NOTE — PROGRESS NOTES
This is a recent snapshot of the patient's [facility] medical record.  For current drug dose and complete information and questions, call [phone] or In Basket pool, fv home infusion ([id number])  CSN Number:  [id number]

## 2018-12-24 RX ORDER — HYDROCODONE BITARTRATE AND ACETAMINOPHEN 5; 325 MG/1; MG/1
1 TABLET ORAL EVERY 6 HOURS PRN
Status: ON HOLD | COMMUNITY
End: 2018-12-26

## 2018-12-24 RX ORDER — HYDROCODONE BITARTRATE AND ACETAMINOPHEN 10; 325 MG/1; MG/1
1 TABLET ORAL EVERY 6 HOURS
Status: ON HOLD | COMMUNITY
End: 2018-12-26

## 2018-12-24 RX ORDER — LORAZEPAM 1 MG/1
1 TABLET ORAL
COMMUNITY

## 2018-12-26 ENCOUNTER — ANESTHESIA (OUTPATIENT)
Dept: SURGERY | Facility: CLINIC | Age: 48
End: 2018-12-26
Payer: COMMERCIAL

## 2018-12-26 ENCOUNTER — APPOINTMENT (OUTPATIENT)
Dept: GENERAL RADIOLOGY | Facility: CLINIC | Age: 48
End: 2018-12-26
Attending: ORTHOPAEDIC SURGERY
Payer: COMMERCIAL

## 2018-12-26 ENCOUNTER — HOSPITAL ENCOUNTER (OUTPATIENT)
Facility: CLINIC | Age: 48
Discharge: HOME | End: 2018-12-26
Attending: ORTHOPAEDIC SURGERY | Admitting: ORTHOPAEDIC SURGERY
Payer: COMMERCIAL

## 2018-12-26 ENCOUNTER — ANESTHESIA EVENT (OUTPATIENT)
Dept: SURGERY | Facility: CLINIC | Age: 48
End: 2018-12-26
Payer: COMMERCIAL

## 2018-12-26 VITALS
SYSTOLIC BLOOD PRESSURE: 112 MMHG | RESPIRATION RATE: 22 BRPM | WEIGHT: 170 LBS | HEART RATE: 104 BPM | HEIGHT: 65 IN | TEMPERATURE: 98 F | DIASTOLIC BLOOD PRESSURE: 69 MMHG | OXYGEN SATURATION: 92 % | BODY MASS INDEX: 28.32 KG/M2

## 2018-12-26 DIAGNOSIS — Z98.890 S/P LUMBAR DISCECTOMY: Primary | ICD-10-CM

## 2018-12-26 PROCEDURE — 37000009 ZZH ANESTHESIA TECHNICAL FEE, EACH ADDTL 15 MIN: Performed by: ORTHOPAEDIC SURGERY

## 2018-12-26 PROCEDURE — 25000566 ZZH SEVOFLURANE, EA 15 MIN: Performed by: ORTHOPAEDIC SURGERY

## 2018-12-26 PROCEDURE — 71000027 ZZH RECOVERY PHASE 2 EACH 15 MINS: Performed by: ORTHOPAEDIC SURGERY

## 2018-12-26 PROCEDURE — 25000125 ZZHC RX 250: Performed by: NURSE ANESTHETIST, CERTIFIED REGISTERED

## 2018-12-26 PROCEDURE — 25000128 H RX IP 250 OP 636: Performed by: PHYSICIAN ASSISTANT

## 2018-12-26 PROCEDURE — 40000277 XR SURGERY CARM FLUORO LESS THAN 5 MIN W STILLS

## 2018-12-26 PROCEDURE — 71000012 ZZH RECOVERY PHASE 1 LEVEL 1 FIRST HR: Performed by: ORTHOPAEDIC SURGERY

## 2018-12-26 PROCEDURE — 37000008 ZZH ANESTHESIA TECHNICAL FEE, 1ST 30 MIN: Performed by: ORTHOPAEDIC SURGERY

## 2018-12-26 PROCEDURE — 36000065 ZZH SURGERY LEVEL 4 W FLUORO 1ST 30 MIN: Performed by: ORTHOPAEDIC SURGERY

## 2018-12-26 PROCEDURE — 25000132 ZZH RX MED GY IP 250 OP 250 PS 637: Performed by: ORTHOPAEDIC SURGERY

## 2018-12-26 PROCEDURE — 40000170 ZZH STATISTIC PRE-PROCEDURE ASSESSMENT II: Performed by: ORTHOPAEDIC SURGERY

## 2018-12-26 PROCEDURE — 25000132 ZZH RX MED GY IP 250 OP 250 PS 637: Performed by: PHYSICIAN ASSISTANT

## 2018-12-26 PROCEDURE — 25000128 H RX IP 250 OP 636: Performed by: NURSE ANESTHETIST, CERTIFIED REGISTERED

## 2018-12-26 PROCEDURE — 25000125 ZZHC RX 250

## 2018-12-26 PROCEDURE — 25000128 H RX IP 250 OP 636: Performed by: ORTHOPAEDIC SURGERY

## 2018-12-26 PROCEDURE — 27210794 ZZH OR GENERAL SUPPLY STERILE: Performed by: ORTHOPAEDIC SURGERY

## 2018-12-26 PROCEDURE — 71000013 ZZH RECOVERY PHASE 1 LEVEL 1 EA ADDTL HR: Performed by: ORTHOPAEDIC SURGERY

## 2018-12-26 PROCEDURE — 25000125 ZZHC RX 250: Performed by: ORTHOPAEDIC SURGERY

## 2018-12-26 PROCEDURE — 36000063 ZZH SURGERY LEVEL 4 EA 15 ADDTL MIN: Performed by: ORTHOPAEDIC SURGERY

## 2018-12-26 PROCEDURE — 25000128 H RX IP 250 OP 636: Performed by: ANESTHESIOLOGY

## 2018-12-26 RX ORDER — MEPERIDINE HYDROCHLORIDE 25 MG/ML
12.5 INJECTION INTRAMUSCULAR; INTRAVENOUS; SUBCUTANEOUS
Status: DISCONTINUED | OUTPATIENT
Start: 2018-12-26 | End: 2018-12-26 | Stop reason: HOSPADM

## 2018-12-26 RX ORDER — ALBUTEROL SULFATE 0.83 MG/ML
2.5 SOLUTION RESPIRATORY (INHALATION)
Status: DISCONTINUED | OUTPATIENT
Start: 2018-12-26 | End: 2018-12-26 | Stop reason: HOSPADM

## 2018-12-26 RX ORDER — NALOXONE HYDROCHLORIDE 0.4 MG/ML
.1-.4 INJECTION, SOLUTION INTRAMUSCULAR; INTRAVENOUS; SUBCUTANEOUS
Status: DISCONTINUED | OUTPATIENT
Start: 2018-12-26 | End: 2018-12-26 | Stop reason: HOSPADM

## 2018-12-26 RX ORDER — DEXAMETHASONE SODIUM PHOSPHATE 4 MG/ML
INJECTION, SOLUTION INTRA-ARTICULAR; INTRALESIONAL; INTRAMUSCULAR; INTRAVENOUS; SOFT TISSUE PRN
Status: DISCONTINUED | OUTPATIENT
Start: 2018-12-26 | End: 2018-12-26

## 2018-12-26 RX ORDER — LORAZEPAM 2 MG/ML
.5-1 INJECTION INTRAMUSCULAR
Status: DISCONTINUED | OUTPATIENT
Start: 2018-12-26 | End: 2018-12-26 | Stop reason: HOSPADM

## 2018-12-26 RX ORDER — LIDOCAINE HYDROCHLORIDE 20 MG/ML
INJECTION, SOLUTION INFILTRATION; PERINEURAL PRN
Status: DISCONTINUED | OUTPATIENT
Start: 2018-12-26 | End: 2018-12-26

## 2018-12-26 RX ORDER — OXYCODONE HYDROCHLORIDE 5 MG/1
5 TABLET ORAL ONCE
Status: COMPLETED | OUTPATIENT
Start: 2018-12-26 | End: 2018-12-26

## 2018-12-26 RX ORDER — GLYCOPYRROLATE 0.2 MG/ML
INJECTION, SOLUTION INTRAMUSCULAR; INTRAVENOUS PRN
Status: DISCONTINUED | OUTPATIENT
Start: 2018-12-26 | End: 2018-12-26

## 2018-12-26 RX ORDER — CEFAZOLIN SODIUM 2 G/100ML
2 INJECTION, SOLUTION INTRAVENOUS
Status: COMPLETED | OUTPATIENT
Start: 2018-12-26 | End: 2018-12-26

## 2018-12-26 RX ORDER — ACETAMINOPHEN 325 MG/1
975 TABLET ORAL ONCE
Status: COMPLETED | OUTPATIENT
Start: 2018-12-26 | End: 2018-12-26

## 2018-12-26 RX ORDER — FENTANYL CITRATE 50 UG/ML
INJECTION, SOLUTION INTRAMUSCULAR; INTRAVENOUS PRN
Status: DISCONTINUED | OUTPATIENT
Start: 2018-12-26 | End: 2018-12-26

## 2018-12-26 RX ORDER — DIMENHYDRINATE 50 MG/ML
12.5 INJECTION, SOLUTION INTRAMUSCULAR; INTRAVENOUS
Status: DISCONTINUED | OUTPATIENT
Start: 2018-12-26 | End: 2018-12-26 | Stop reason: HOSPADM

## 2018-12-26 RX ORDER — KETAMINE HCL IN 0.9 % NACL 20 MG/2 ML
SYRINGE (ML) INTRAVENOUS PRN
Status: DISCONTINUED | OUTPATIENT
Start: 2018-12-26 | End: 2018-12-26

## 2018-12-26 RX ORDER — SODIUM CHLORIDE, SODIUM LACTATE, POTASSIUM CHLORIDE, CALCIUM CHLORIDE 600; 310; 30; 20 MG/100ML; MG/100ML; MG/100ML; MG/100ML
INJECTION, SOLUTION INTRAVENOUS CONTINUOUS PRN
Status: DISCONTINUED | OUTPATIENT
Start: 2018-12-26 | End: 2018-12-26

## 2018-12-26 RX ORDER — ONDANSETRON 2 MG/ML
4 INJECTION INTRAMUSCULAR; INTRAVENOUS EVERY 30 MIN PRN
Status: DISCONTINUED | OUTPATIENT
Start: 2018-12-26 | End: 2018-12-26 | Stop reason: HOSPADM

## 2018-12-26 RX ORDER — PROPOFOL 10 MG/ML
INJECTION, EMULSION INTRAVENOUS PRN
Status: DISCONTINUED | OUTPATIENT
Start: 2018-12-26 | End: 2018-12-26

## 2018-12-26 RX ORDER — CEFAZOLIN SODIUM 1 G/3ML
1 INJECTION, POWDER, FOR SOLUTION INTRAMUSCULAR; INTRAVENOUS SEE ADMIN INSTRUCTIONS
Status: DISCONTINUED | OUTPATIENT
Start: 2018-12-26 | End: 2018-12-26 | Stop reason: HOSPADM

## 2018-12-26 RX ORDER — IBUPROFEN 600 MG/1
600 TABLET, FILM COATED ORAL EVERY 6 HOURS PRN
Qty: 60 TABLET | Refills: 1 | Status: SHIPPED | OUTPATIENT
Start: 2018-12-26 | End: 2019-01-25

## 2018-12-26 RX ORDER — DEXAMETHASONE SODIUM PHOSPHATE 4 MG/ML
4 INJECTION, SOLUTION INTRA-ARTICULAR; INTRALESIONAL; INTRAMUSCULAR; INTRAVENOUS; SOFT TISSUE EVERY 10 MIN PRN
Status: DISCONTINUED | OUTPATIENT
Start: 2018-12-26 | End: 2018-12-26 | Stop reason: HOSPADM

## 2018-12-26 RX ORDER — NEOSTIGMINE METHYLSULFATE 1 MG/ML
VIAL (ML) INJECTION PRN
Status: DISCONTINUED | OUTPATIENT
Start: 2018-12-26 | End: 2018-12-26

## 2018-12-26 RX ORDER — SODIUM CHLORIDE, SODIUM LACTATE, POTASSIUM CHLORIDE, CALCIUM CHLORIDE 600; 310; 30; 20 MG/100ML; MG/100ML; MG/100ML; MG/100ML
INJECTION, SOLUTION INTRAVENOUS CONTINUOUS
Status: DISCONTINUED | OUTPATIENT
Start: 2018-12-26 | End: 2018-12-26 | Stop reason: HOSPADM

## 2018-12-26 RX ORDER — ONDANSETRON 2 MG/ML
INJECTION INTRAMUSCULAR; INTRAVENOUS PRN
Status: DISCONTINUED | OUTPATIENT
Start: 2018-12-26 | End: 2018-12-26

## 2018-12-26 RX ORDER — FENTANYL CITRATE 50 UG/ML
25-50 INJECTION, SOLUTION INTRAMUSCULAR; INTRAVENOUS EVERY 5 MIN PRN
Status: DISCONTINUED | OUTPATIENT
Start: 2018-12-26 | End: 2018-12-26 | Stop reason: HOSPADM

## 2018-12-26 RX ORDER — BETAMETHASONE SODIUM PHOSPHATE AND BETAMETHASONE ACETATE 3; 3 MG/ML; MG/ML
INJECTION, SUSPENSION INTRA-ARTICULAR; INTRALESIONAL; INTRAMUSCULAR; SOFT TISSUE PRN
Status: DISCONTINUED | OUTPATIENT
Start: 2018-12-26 | End: 2018-12-26 | Stop reason: HOSPADM

## 2018-12-26 RX ORDER — VECURONIUM BROMIDE 1 MG/ML
INJECTION, POWDER, LYOPHILIZED, FOR SOLUTION INTRAVENOUS PRN
Status: DISCONTINUED | OUTPATIENT
Start: 2018-12-26 | End: 2018-12-26

## 2018-12-26 RX ORDER — ONDANSETRON 4 MG/1
4 TABLET, ORALLY DISINTEGRATING ORAL EVERY 30 MIN PRN
Status: DISCONTINUED | OUTPATIENT
Start: 2018-12-26 | End: 2018-12-26 | Stop reason: HOSPADM

## 2018-12-26 RX ORDER — OXYCODONE HYDROCHLORIDE 5 MG/1
5 TABLET ORAL EVERY 6 HOURS PRN
Qty: 25 TABLET | Refills: 0 | Status: SHIPPED | OUTPATIENT
Start: 2018-12-26 | End: 2018-12-29

## 2018-12-26 RX ORDER — KETOROLAC TROMETHAMINE 30 MG/ML
INJECTION, SOLUTION INTRAMUSCULAR; INTRAVENOUS PRN
Status: DISCONTINUED | OUTPATIENT
Start: 2018-12-26 | End: 2018-12-26

## 2018-12-26 RX ORDER — LIDOCAINE HYDROCHLORIDE AND EPINEPHRINE 10; 10 MG/ML; UG/ML
INJECTION, SOLUTION INFILTRATION; PERINEURAL PRN
Status: DISCONTINUED | OUTPATIENT
Start: 2018-12-26 | End: 2018-12-26 | Stop reason: HOSPADM

## 2018-12-26 RX ORDER — HYDROMORPHONE HYDROCHLORIDE 1 MG/ML
.3-.5 INJECTION, SOLUTION INTRAMUSCULAR; INTRAVENOUS; SUBCUTANEOUS EVERY 10 MIN PRN
Status: DISCONTINUED | OUTPATIENT
Start: 2018-12-26 | End: 2018-12-26 | Stop reason: HOSPADM

## 2018-12-26 RX ADMIN — PHENYLEPHRINE HYDROCHLORIDE 100 MCG: 10 INJECTION, SOLUTION INTRAMUSCULAR; INTRAVENOUS; SUBCUTANEOUS at 14:00

## 2018-12-26 RX ADMIN — SODIUM CHLORIDE, POTASSIUM CHLORIDE, SODIUM LACTATE AND CALCIUM CHLORIDE: 600; 310; 30; 20 INJECTION, SOLUTION INTRAVENOUS at 14:15

## 2018-12-26 RX ADMIN — PHENYLEPHRINE HYDROCHLORIDE 100 MCG: 10 INJECTION, SOLUTION INTRAMUSCULAR; INTRAVENOUS; SUBCUTANEOUS at 15:01

## 2018-12-26 RX ADMIN — FENTANYL CITRATE 100 MCG: 50 INJECTION, SOLUTION INTRAMUSCULAR; INTRAVENOUS at 13:30

## 2018-12-26 RX ADMIN — HYDROMORPHONE HYDROCHLORIDE 0.5 MG: 1 INJECTION, SOLUTION INTRAMUSCULAR; INTRAVENOUS; SUBCUTANEOUS at 16:15

## 2018-12-26 RX ADMIN — VECURONIUM BROMIDE 3 MG: 1 INJECTION, POWDER, LYOPHILIZED, FOR SOLUTION INTRAVENOUS at 14:30

## 2018-12-26 RX ADMIN — PROPOFOL 50 MG: 10 INJECTION, EMULSION INTRAVENOUS at 14:15

## 2018-12-26 RX ADMIN — PROPOFOL 50 MG: 10 INJECTION, EMULSION INTRAVENOUS at 14:30

## 2018-12-26 RX ADMIN — PHENYLEPHRINE HYDROCHLORIDE 100 MCG: 10 INJECTION, SOLUTION INTRAMUSCULAR; INTRAVENOUS; SUBCUTANEOUS at 14:30

## 2018-12-26 RX ADMIN — HYDROMORPHONE HYDROCHLORIDE 0.5 MG: 1 INJECTION, SOLUTION INTRAMUSCULAR; INTRAVENOUS; SUBCUTANEOUS at 14:15

## 2018-12-26 RX ADMIN — ACETAMINOPHEN 975 MG: 325 TABLET, FILM COATED ORAL at 13:16

## 2018-12-26 RX ADMIN — GLYCOPYRROLATE 0.6 MG: 0.2 INJECTION, SOLUTION INTRAMUSCULAR; INTRAVENOUS at 15:04

## 2018-12-26 RX ADMIN — SODIUM CHLORIDE, POTASSIUM CHLORIDE, SODIUM LACTATE AND CALCIUM CHLORIDE: 600; 310; 30; 20 INJECTION, SOLUTION INTRAVENOUS at 15:13

## 2018-12-26 RX ADMIN — HYDROMORPHONE HYDROCHLORIDE 0.5 MG: 1 INJECTION, SOLUTION INTRAMUSCULAR; INTRAVENOUS; SUBCUTANEOUS at 14:09

## 2018-12-26 RX ADMIN — MIDAZOLAM 2 MG: 1 INJECTION INTRAMUSCULAR; INTRAVENOUS at 13:30

## 2018-12-26 RX ADMIN — DEXAMETHASONE SODIUM PHOSPHATE 8 MG: 4 INJECTION, SOLUTION INTRA-ARTICULAR; INTRALESIONAL; INTRAMUSCULAR; INTRAVENOUS; SOFT TISSUE at 13:45

## 2018-12-26 RX ADMIN — ONDANSETRON 4 MG: 2 INJECTION INTRAMUSCULAR; INTRAVENOUS at 14:45

## 2018-12-26 RX ADMIN — Medication 20 MG: at 14:00

## 2018-12-26 RX ADMIN — CEFAZOLIN SODIUM 2 G: 2 INJECTION, SOLUTION INTRAVENOUS at 13:55

## 2018-12-26 RX ADMIN — PHENYLEPHRINE HYDROCHLORIDE 100 MCG: 10 INJECTION, SOLUTION INTRAMUSCULAR; INTRAVENOUS; SUBCUTANEOUS at 14:15

## 2018-12-26 RX ADMIN — ROCURONIUM BROMIDE 50 MG: 10 INJECTION INTRAVENOUS at 13:30

## 2018-12-26 RX ADMIN — NEOSTIGMINE METHYLSULFATE 3 MG: 1 INJECTION, SOLUTION INTRAVENOUS at 15:04

## 2018-12-26 RX ADMIN — PROPOFOL 200 MG: 10 INJECTION, EMULSION INTRAVENOUS at 13:30

## 2018-12-26 RX ADMIN — LIDOCAINE HYDROCHLORIDE 100 MG: 20 INJECTION, SOLUTION INFILTRATION; PERINEURAL at 13:30

## 2018-12-26 RX ADMIN — KETOROLAC TROMETHAMINE 30 MG: 30 INJECTION, SOLUTION INTRAMUSCULAR at 14:45

## 2018-12-26 RX ADMIN — SODIUM CHLORIDE, POTASSIUM CHLORIDE, SODIUM LACTATE AND CALCIUM CHLORIDE: 600; 310; 30; 20 INJECTION, SOLUTION INTRAVENOUS at 13:40

## 2018-12-26 RX ADMIN — OXYCODONE HYDROCHLORIDE 5 MG: 5 TABLET ORAL at 16:25

## 2018-12-26 RX ADMIN — DEXMEDETOMIDINE HYDROCHLORIDE 0.3 MCG/KG/HR: 100 INJECTION, SOLUTION INTRAVENOUS at 13:46

## 2018-12-26 RX ADMIN — Medication 20 MG: at 14:45

## 2018-12-26 RX ADMIN — PHENYLEPHRINE HYDROCHLORIDE 100 MCG: 10 INJECTION, SOLUTION INTRAMUSCULAR; INTRAVENOUS; SUBCUTANEOUS at 14:45

## 2018-12-26 ASSESSMENT — MIFFLIN-ST. JEOR: SCORE: 1396.99

## 2018-12-26 ASSESSMENT — LIFESTYLE VARIABLES: TOBACCO_USE: 1

## 2018-12-26 NOTE — ANESTHESIA PREPROCEDURE EVALUATION
Anesthesia Pre-Procedure Evaluation    Patient: Genoveva Harrell   MRN: [id number] : 1970          Preoperative Diagnosis: LEFT L5-S1 DISC HERNIATION AND RADUCULOPATHY    Procedure(s):  LEFT L5-S1 DISCECTOMY (GUAMAN FRAME AND C ARM)    Past Medical History:   Diagnosis Date     Controlled substance agreement signed      KAR (generalized anxiety disorder)      Hypertension      Lumbar radiculopathy      Osteoarthritis      Thyroid disease      Tobacco dependence      Past Surgical History:   Procedure Laterality Date     GYN SURGERY      partial hysterectomy     HERNIA REPAIR  2018     ORTHOPEDIC SURGERY      left foot surgery     REMOVE HARDWARE FOOT Left 2018    Procedure: REMOVE HARDWARE FOOT;  1.  Left foot removal of hardware.   2.  Sharp excisional debridement through skin and subcutaneous tissues down to bone on the first metatarsophalangeal joint with application of stimulan absorbable antibiotic vancomycin impregnated beads.   3.  Debridement of callus through skin and subcutaneous tissues fifth metatarsal head region plantarly. ;  Surgeon: Alisa       Anesthesia Evaluation     . Pt has had prior anesthetic. Type: General    No history of anesthetic complications          ROS/MED HX    ENT/Pulmonary:     (+)tobacco use, Current use , recent URI patient denies,  reports cough: . .   (-) sleep apnea   Neurologic:  - neg neurologic ROS     Cardiovascular:     (+) hypertension----. : . . . :. .       METS/Exercise Tolerance:  >4 METS   Hematologic:         Musculoskeletal:         GI/Hepatic:  - neg GI/hepatic ROS       Renal/Genitourinary:      (-) renal disease   Endo:     (+) thyroid problem hypothyroidism, .      Psychiatric:     (+) psychiatric history anxiety and depression      Infectious Disease:         Malignancy:         Other:    (+) H/O Chronic Pain,H/O chronic opiod use ,                         Physical Exam  Normal systems: dental    Airway   Mallampati: II  TM distance: >3  "FB  Neck ROM: full    Dental     Cardiovascular   Rhythm and rate: regular and normal      Pulmonary    breath sounds clear to auscultation            Lab Results   Component Value Date    WBC 13.3 (H) 09/17/2018    HGB 14.3 09/17/2018    HCT 43.6 09/17/2018     09/17/2018    CRP 12.7 (H) 09/17/2018    SED 13 09/17/2018    BUN 7 09/17/2018    CR 0.86 09/17/2018     (H) 08/30/2018       Preop Vitals  BP Readings from Last 3 Encounters:   12/26/18 121/68   08/31/18 142/73    Pulse Readings from Last 3 Encounters:   08/30/18 65      Resp Readings from Last 3 Encounters:   12/26/18 20   08/31/18 16    SpO2 Readings from Last 3 Encounters:   12/26/18 99%   08/31/18 98%      Temp Readings from Last 1 Encounters:   12/26/18 36.1  C (96.9  F) (Oral)    Ht Readings from Last 1 Encounters:   12/26/18 1.651 m (5' 5\")      Wt Readings from Last 1 Encounters:   12/26/18 77.1 kg (170 lb)    Estimated body mass index is 28.29 kg/m  as calculated from the following:    Height as of this encounter: 1.651 m (5' 5\").    Weight as of this encounter: 77.1 kg (170 lb).       Anesthesia Plan      History & Physical Review  History and physical reviewed and following examination; no interval change.    ASA Status:  2 .    NPO Status:  > 8 hours    Plan for General and ETT with Intravenous and Propofol induction.          Postoperative Care      Consents  Anesthetic plan, risks, benefits and alternatives discussed with:  Patient..                 Sander Dietz MD  "

## 2018-12-26 NOTE — BRIEF OP NOTE
[facility]  Spinal Surgery Brief Operative Note    Pre-operative diagnosis: LEFT L5-S1 DISC HERNIATION AND RADUCULOPATHY   Post-operative diagnosis: Same with discogenic cyst   Procedure: LEFT L5-S1 DECOMPRESSION/DISCECTOMY EXCISION OF DISCOGENIC CYST   Surgeon: LORIE HOWELL MD   Assistant(s): SCOOTER PAGAN PA-C   Anesthesia: General endotracheal anesthesia   Estimated blood loss: 10 ml   Total IV fluids: (See anesthesia record)   Blood transfusion: NONE   Drains: None   Specimens: NONE   Implants: AS ABOVE   Findings: AS ABOVE   Complications: NONE   Condition: STABLE   Comments: SEE DICTATED OPERATIVE REPORT FOR DETAILS

## 2018-12-26 NOTE — DISCHARGE INSTRUCTIONS
Today you were given 975 mg of Tylenol at 1320. The recommended daily maximum dose is 4000 mg.     Today you received Toradol, an antiinflammatory medication similar to Ibuprofen.  You should not take other antiinflammatory medication, such as Ibuprofen, Motrin, Advil, Aleve, Naprosyn, etc until 8;45pm.     Either take ibuprofen or naprosyn(see medication list)...do not take them both.    Same Day Surgery Discharge Instructions for  Sedation and General Anesthesia       It's not unusual to feel dizzy, light-headed or faint for up to 24 hours after surgery or while taking pain medication.  If you have these symptoms: sit for a few minutes before standing and have someone assist you when you get up to walk or use the bathroom.      You should rest and relax for the next 24 hours. We recommend you make arrangements to have an adult stay with you for at least 24 hours after your discharge.  Avoid hazardous and strenuous activity.      DO NOT DRIVE any vehicle or operate mechanical equipment for 24 hours following the end of your surgery.  Even though you may feel normal, your reactions may be affected by the medication you have received.      Do not drink alcoholic beverages for 24 hours following surgery.       Slowly progress to your regular diet as you feel able. It's not unusual to feel nauseated and/or vomit after receiving anesthesia.  If you develop these symptoms, drink clear liquids (apple juice, ginger ale, broth, 7-up, etc. ) until you feel better.  If your nausea and vomiting persists for 24 hours, please notify your surgeon.        All narcotic pain medications, along with inactivity and anesthesia, can cause constipation. Drinking plenty of liquids and increasing fiber intake will help.      For any questions of a medical nature, call your surgeon.      Do not make important decisions for 24 hours.      If you had general anesthesia, you may have a sore throat for a couple of days related to the breathing  tube used during surgery.  You may use Cepacol lozenges to help with this discomfort.  If it worsens or if you develop a fever, contact your surgeon.       If you feel your pain is not well managed with the pain medications prescribed by your surgeon, please contact your surgeon's office to let them know so they can address your concerns.         [facility]   Post-Operative Laminectomy/Discectomy Instructions  Dr. Silvio Pink    As you are getting ready to leave the hospital following your surgery, you will have many questions regarding your follow-up and after hospital care.  Dr. Pink will talk to you about many of these questions in the hospital but it is also important for you to have your instructions written down so that you can refer to them when you get home.  These are general guidelines that apply to patients that had your type of surgery.      If you were not given a post-operative follow up appointment then please call Dr. Pink's office at  [phone] for a follow-up appointment for 10-14 days after the date of your surgery.      After surgery you may notice some continued leg or back pain similar to the symptoms you had prior to surgery.  This is normal and usually is related to the amount of pressure and the length of time that the nerves were pinched.  Sometimes the pain that you felt in your leg is replaced with some degree of numbness that gradually fades over a period of days or weeks.      Certain postures and activities can cause an increase in back or leg pain and may even put you at risk for recurrence.  Avoid any twisting or bending.  Do not lift anything that weighs more than 15 pounds.  When picking things up from the floor, be sure to bend at the knees.  When moving about, it is best to try to keep your back straight and well supported.      Try to restrict your sitting to a minimum for the first 1-2 days for periods less than 20-30 min at a time as sitting places  an increased load on the intervertebral discs. Gradually increase sitting, as comfort allows over the first week post-op.  When you do sit, try to use a chair that provides adequate support for your back.  When riding in a car, you may want to recline the seat to lessen the load on the intervertebral disc.      Walking is an excellent post-operative exercise.  You are encouraged to walk several times per day for at least five to ten minutes at a time.  Walking for longer periods is all right if you do not develop a pain in either your back or legs.  Let your comfort be your guide in helping you set limits.  Do not participate in any sports activities.      You may shower on the third day after your surgery, if your incision is clean and dry.  You may shower earlier if you have a plastic dressing (Tegaderm) to cover your incision.  Do not soak in a tub until you are seen in the office for your follow-up appointment with Dr. Pink.      The small pieces of tape over your incision are called Steri-Strips. They can be removed if they become lose; other-wise leave steri-strips on for 14 days.  Your stitches are the kind that dissolves under the skin.  They do not have to be removed.      Occasionally, non-absorbable sutures (black nylon) need to be used.  This is not covered by Steri-Strips.  These sutures should be covered with a waterproof barrier when showering (Tegaderm or foam tape), and will be removed in the office at your follow-up appointment.      If you go home the day of surgery, you should put on a fresh dressing the following day.  If you stay overnight in the hospital, the nurse will check your incision and change the dressing before you are discharged.  Change your dressing daily for 10-14 days.        If you had compression stockings (TEDS) on in the hospital you do not need to wear these after you are discharged from the hospital.      You should not drive until you are no longer taking narcotic pain  medication.      After your surgery, you may begin to engage in sexual activity as comfort allows.  For the first four weeks it is recommended that you participate as a passive partner.      Before returning to work, please consult Dr. Pink regarding work limitations or restrictions. Your ability to return to work will depend on the type of work that you do and the type of surgery you had.  Please let Dr. Pink know if you need any written information for your employer.      Infection following disc surgery is rare.  Signs of infection include: drainage from your incision, increasing redness at the margins of your wound, fevers (greater than 101   Fahrenheit), chills, rapidly increasing back pain.  If you have any of these symptoms, please call the office and either Dr. Pink or one of the nurses can discuss this with you.  Other reasons to call the office would include difficulty passing your urine or trouble controlling your bowels.      If you have any questions or concerns not covered in these instructions, please feel free to call Dr. Pink's office at [phone].  Someone is always available to answer your questions.                                                 **If you have questions or concerns about your procedure, call Dr Pink at [phone]**

## 2018-12-26 NOTE — ANESTHESIA POSTPROCEDURE EVALUATION
Patient: Genoveva Harrell    Procedure(s):  LEFT L5-S1 DISCECTOMY    Diagnosis:LEFT L5-S1 DISC HERNIATION AND RADUCULOPATHY  Diagnosis Additional Information: No value filed.    Anesthesia Type:  General    Note:  Anesthesia Post Evaluation    Patient location during evaluation: PACU  Patient participation: Able to fully participate in evaluation  Level of consciousness: awake  Pain management: adequate  Airway patency: patent  Cardiovascular status: acceptable  Respiratory status: acceptable  Hydration status: acceptable  PONV: none     Anesthetic complications: None          Last vitals:  Vitals:    12/26/18 1615 12/26/18 1620 12/26/18 1630   BP: 92/72 112/75 112/69   Pulse: 98 104 104   Resp: 23 20 22   Temp:      SpO2: 91% 92% 92%         Electronically Signed By: Sander Dietz MD  December 26, 2018  4:49 PM

## 2018-12-26 NOTE — ANESTHESIA CARE TRANSFER NOTE
Patient: Genoveva Harrell    Procedure(s):  LEFT L5-S1 DISCECTOMY    Diagnosis: LEFT L5-S1 DISC HERNIATION AND RADUCULOPATHY  Diagnosis Additional Information: No value filed.    Anesthesia Type:   General     Note:  Airway :Face Mask  Patient transferred to:PACU  Comments: Transferred to PACU, spontaneous respirations, 10L oxygen via facemask.  All monitors and alarms on and functioning, VSS.  Patient awake, comfortable.  Report to PACU RN.Handoff Report: Identifed the Patient, Identified the Reponsible Provider, Reviewed the pertinent medical history, Discussed the surgical course, Reviewed Intra-OP anesthesia mangement and issues during anesthesia, Set expectations for post-procedure period and Allowed opportunity for questions and acknowledgement of understanding      Vitals: (Last set prior to Anesthesia Care Transfer)    CRNA VITALS  12/26/2018 1444 - 12/26/2018 1521      12/26/2018             Pulse:  125    SpO2:  100 %                Electronically Signed By: MAGGIE Grier CRNA  December 26, 2018  3:21 PM

## 2018-12-27 NOTE — OP NOTE
Procedure Date: 12/26/2018      PREOPERATIVE DIAGNOSES:  Left L5-S1 disk herniation with left leg radiculopathy.      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      PROCEDURES:  Left L5-S1 decompression with diskectomy and removal of discogenic cyst.      SURGEON:  Silvio Pink MD      ASSISTANT:  Nicolette Garcia PA-C       ANESTHESIA:  General.      OPERATIVE DESCRIPTION:  The patient was brought to the operating room.  A general anesthetic was administered.  The patient was positioned prone on the Montiel frame,  carefully padded and positioned.  Her back was prepped and draped in routine sterile fashion.  A marking needle was placed at what was felt to be the L5-S1 level.  A lateral x-ray was obtained.  This confirmed that it was L5-S1.  The needle was removed.  The planned skin incision was injected with 10 mL of 0.5% Marcaine with epinephrine.  After the timeout, a midline skin incision made over the L5-S1 level.  Electrocautery was used to develop the incision down to the fascia.  The fascia over the spinous process of L5 was incised and a clamp was placed on the spinous process of L5.  A lateral x-ray was obtained and this also confirmed our level.  Jose L retractor was placed.  The interspace between L5 and S1 was exposed.  The ligamentum flavum was removed between L5 and S1.  The S1 nerve root was identified.  It was tethered down by some blood vessels over the disk.  I was able to mobilize it gradually and found that there was a discogenic cyst directly underneath the nerve root.  This was somewhat purplish in color, pushing up underneath the nerve.  It appeared to have emanated from the disk itself.  It was not associated with the facet joint.  This was incised and some purplish material extruded consistent with somewhat of a hemorrhagic-type cyst.  I took another x-ray with a needle in the disk space.  There were no additional free fragments  of disk.  I looked underneath the nerve, in the axilla of the nerve, and there were no additional fragments      At this point, then the wound was irrigated with antibiotic solution.  Additional Marcaine was used in the skin, fascia and muscle.  After final irrigation, Gelfoam and Celestone were placed over the exposed dura and nerve root.  The wound was closed in routine fashion with interrupted #1 Vicryl suture for the deep fascia, 2-0 for the subcutaneous, and 3-0 Vicryl for the skin.  Dressings were applied, drapes removed.  She was transferred back on the hospital cart and taken to the recovery room in good condition.      ESTIMATED BLOOD LOSS:  10 mL.      COMPLICATIONS:  None.      DRAINS:  None.         LORIE HOWELL MD             D: 2018   T: 2018   MT: ANNALISA      Name:     HEATHER QUINTANILLA   MRN:      [id number]        Account:        [id number]   :      1970           Procedure Date: 2018      Document: [id number]       cc: Quintin Howell MD

## 2020-08-06 NOTE — PROGRESS NOTES
[facility]  Hospitalist Progress Note  Name: Genoveva Harrell    MRN: [id number]  YOB: 1970    Age: 48 year old  Date of admission: 8/29/2018  Primary care provider: Doug, Jorge Alberto Dutton      Reason for Stay (Diagnosis): S/p L foot removal of hardware, I&D down to bone on 1st MTP w/ application of antibiotic beads, and debridement of 5th metatarsal head callus         Assessment and Plan:      Summary of Stay:  This patient is a 48 year old female with a PMH significant for HTN, hypothyroidism, KAR, who is postop s/p infected hardware removal from left foot (left 1st ray, 1st MTP joint), excisional debridement on the first MTP joint w/ application of antibiotic vancomycin impregnated beads, and debridement of callus/ulceration of skin over the left 5th metatarsal head.     1. S/p L foot removal of hardware, I&D down to bone on 1st MTP w/ application of antibiotic beads, and debridement of 5th metatarsal head callus: Postop day #1.  Doing well, cont PT/OT and pain control as per primary.  ID input appreciated.  Await cultures and sensitivities.  Continue vancomycin for now.  DVT Prophylaxis: on PCDs as per primary.     2. Hypertension: Resumed PTA lisinopril-hydrochlorothiazide 20-12.5 mg daily (with cocurrent) potassium replacement) and amlodipine 10 mg daily w/ hold parameters.     3. Hypothyroidism: Resumed PTA [place] thyroid 90 mg daily.     4. Generalized anxiety disorder: Resumed PTA citalopram 40 mg daily.     5. Tobacco dependence: Patient is a daily 1 ppd smoker for approximately 30 years. Continue nicotine patch 21 mg/24 hr.    DVT Prophylaxis: Defer to primary service  Code Status: Full Code  Discharge Dispo: Per orthopedics  Estimated Disch Date / # of Days until Disch: Per orthopedics pain control.  No other specific complaints.        Interval History (Subjective):      Pain controlled.  No other specific complaints.         Physical Exam:      Vital  "signs:  Temp: 98.2  F (36.8  C) Temp src: Oral BP: 120/67   Heart Rate: 76 Resp: 20 SpO2: 95 % O2 Device: Nasal cannula Oxygen Delivery: 2 LPM Height: 167.6 cm (5' 6\") Weight: 76.2 kg (168 lb)  Estimated body mass index is 27.12 kg/(m^2) as calculated from the following:    Height as of this encounter: 1.676 m (5' 6\").    Weight as of this encounter: 76.2 kg (168 lb).    I/O last 3 completed shifts:  In: 2040 [P.O.:1040; I.V.:1000]  Out: 5 [Blood:5]  Vitals:    08/29/18 0600   Weight: 76.2 kg (168 lb)       Constitutional: Awake, alert, cooperative, no apparent distress   Respiratory: Nl work of breathing. Clear to auscultation bilaterally, no crackles or wheezing   Cardiovascular: Regular rate and rhythm, normal S1 and S2, and no murmur noted       Skin: No rashes, no cyanosis, dry to touch   Neuro: CN 2-12 intact, no localizing weakness   Extremities: No edema, normal range of motion   HEENT Normocephalic, atraumatic, normal nasal turbinates; oropharynx clear   Neck Supple; nl inspection; trachea midline; no thryomegaly   Psychiatric: A+O x3. Normal affect          Medications:      All current medications were reviewed with changes reflected in problem list.         Data:      All new lab and imaging data was reviewed.   Labs:    Recent Labs  Lab 08/29/18  0833 08/29/18  0831   CULT Culture negative monitoring continues  Culture negative monitoring continues Culture negative monitoring continues  Culture negative monitoring continues       Recent Labs  Lab 08/30/18  0606 08/29/18  1525   WBC 10.7 9.8   HGB 13.2 13.9   HCT 39.9 41.3   MCV 92 91    295       Recent Labs  Lab 08/30/18  0606 08/29/18  1525   *  --    CR  --  0.51*   GFRESTIMATED  --  >90   GFRESTBLACK  --  >90      Imaging:   No results found for this or any previous visit (from the past 24 hour(s)).    Camila York [phone]      " Reason for Admission: R pathologic humerus fx    History of Present Illness:     72yFemale w/ Breast CA coming in due to R pathologic humerus fx. Patient denies numbness or tingling in the RUE. Patient denies any other injuries.         Past Medical, Past Surgical History:    PAST MEDICAL HISTORY:    Bone metastasis     Malignant neoplasm of female breast, unspecified estrogen receptor status, unspecified laterality, unspecified site of breast.         PAST SURGICAL HISTORY:    History of laminectomy.         HOSPITAL COURSE:    73 y/o FM underwent right proximal humerus ORIF on 8/4/2020 with Dr. Deluna.  Patient tolerated procedure well.  Patient was evaluated postoperatively by physical and occupational therapists for non-weight bearing right upper extremity in sling and cleared patient for discharge home.  Patient advised to keep surgical incision/dressing clean and dry and follow up with Dr. Deluna in his office post operative day #14 (8/18/2020).

## (undated) DEVICE — GLOVE PROTEXIS POWDER FREE 8.0 ORTHOPEDIC 2D73ET80

## (undated) DEVICE — GLOVE PROTEXIS POWDER FREE 7.5 ORTHOPEDIC 2D73ET75

## (undated) DEVICE — SUCTION CANISTER MEDIVAC LINER 3000ML W/LID 65651-530

## (undated) DEVICE — DRSG GAUZE 4X4" 8044

## (undated) DEVICE — DRSG ADAPTIC 3X3" 6112

## (undated) DEVICE — DRAPE SHEET REV FOLD 3/4 9349

## (undated) DEVICE — Device

## (undated) DEVICE — DECANTER VIAL 2006S

## (undated) DEVICE — BNDG KLING 3" 2232

## (undated) DEVICE — TUBE CULTURE ANAEROBIC PORT-A-CUL 11ML

## (undated) DEVICE — ESU ELEC BLADE 2.75" COATED/INSULATED E1455

## (undated) DEVICE — CAST PADDING 4" STERILE 9044S

## (undated) DEVICE — LINEN FULL SHEET 5511

## (undated) DEVICE — SUTURE MONOCRYL+ 3-0 PS-1 27" UNDYED MCP936H

## (undated) DEVICE — ESU GROUND PAD ADULT W/CORD E7507

## (undated) DEVICE — BLADE KNIFE SURG 15 371115

## (undated) DEVICE — DRSG GAUZE 4X4" TRAY

## (undated) DEVICE — LINEN TOWEL PACK X5 5464

## (undated) DEVICE — GLOVE PROTEXIS BLUE W/NEU-THERA 7.5  2D73EB75

## (undated) DEVICE — KIT CULTURE ESWAB AEROBE/ANAEROBE WHITE TOP R723480

## (undated) DEVICE — GLOVE PROTEXIS BLUE W/NEU-THERA 8.0  2D73EB80

## (undated) DEVICE — SU VICRYL 3-0 PS-1 18" UND J683

## (undated) DEVICE — NDL 19GA 1.5"

## (undated) DEVICE — PACK SPINE SM CUSTOM SNE15SSFSK

## (undated) DEVICE — GLOVE PROTEXIS POWDER FREE 6.5 ORTHOPEDIC 2D73ET65

## (undated) DEVICE — SPONGE SURGIFOAM 50

## (undated) DEVICE — PREP CHLORAPREP 26ML TINTED ORANGE  260815

## (undated) DEVICE — DRSG GAUZE 4X4" 3033

## (undated) DEVICE — SU VICRYL 1 MO-4 18" J702D

## (undated) DEVICE — LINEN ORTHO ACL PACK 5447

## (undated) DEVICE — ESU ELEC BLADE 4" COATED

## (undated) DEVICE — SPECIMEN CONTAINER 4OZ

## (undated) DEVICE — DRAPE SLEEVE 599

## (undated) DEVICE — LINEN HALF SHEET 5512

## (undated) DEVICE — GLOVE PROTEXIS BLUE W/NEU-THERA 6.5  2D73EB65

## (undated) DEVICE — SU PROLENE 3-0 PS-1 18" 8663G

## (undated) DEVICE — SOLUTION WOUND CLEANSING 3/4OZ 10% PVP EA-L3011FB-50

## (undated) DEVICE — SOL WATER IRRIG 1000ML BOTTLE 2F7114

## (undated) DEVICE — DRAPE C-ARM MINI 5423

## (undated) DEVICE — PACK LOWER EXTREMITY RIDGES

## (undated) DEVICE — SU VICRYL 2-0 CT-1 27" J339H

## (undated) DEVICE — CUSHION INSERT LG PRONE VIEW JACKSON TABLE

## (undated) DEVICE — CAST BUCKET

## (undated) DEVICE — ESU GROUND PAD UNIVERSAL W/O CORD

## (undated) DEVICE — SUCTION MANIFOLD NEPTUNE SGL

## (undated) DEVICE — BAG CLEAR TRASH 1.3M 39X33" P4040C

## (undated) DEVICE — TOURNIQUET CUFF 30" REPRO BLUE 60-7070-105

## (undated) DEVICE — CAST PADDING 4" UNSTERILE 9044

## (undated) DEVICE — BNDG ELASTIC 4"X5YDS UNSTERILE 6611-40

## (undated) RX ORDER — ACETAMINOPHEN 325 MG/1
TABLET ORAL
Status: DISPENSED
Start: 2018-12-26

## (undated) RX ORDER — KETOROLAC TROMETHAMINE 30 MG/ML
INJECTION, SOLUTION INTRAMUSCULAR; INTRAVENOUS
Status: DISPENSED
Start: 2018-12-26

## (undated) RX ORDER — EPHEDRINE SULFATE 50 MG/ML
INJECTION, SOLUTION INTRAMUSCULAR; INTRAVENOUS; SUBCUTANEOUS
Status: DISPENSED
Start: 2018-08-29

## (undated) RX ORDER — BETAMETHASONE SODIUM PHOSPHATE AND BETAMETHASONE ACETATE 3; 3 MG/ML; MG/ML
INJECTION, SUSPENSION INTRA-ARTICULAR; INTRALESIONAL; INTRAMUSCULAR; SOFT TISSUE
Status: DISPENSED
Start: 2018-12-26

## (undated) RX ORDER — PROPOFOL 10 MG/ML
INJECTION, EMULSION INTRAVENOUS
Status: DISPENSED
Start: 2018-12-26

## (undated) RX ORDER — PROPOFOL 10 MG/ML
INJECTION, EMULSION INTRAVENOUS
Status: DISPENSED
Start: 2018-08-29

## (undated) RX ORDER — DEXAMETHASONE SODIUM PHOSPHATE 4 MG/ML
INJECTION, SOLUTION INTRA-ARTICULAR; INTRALESIONAL; INTRAMUSCULAR; INTRAVENOUS; SOFT TISSUE
Status: DISPENSED
Start: 2018-12-26

## (undated) RX ORDER — FENTANYL CITRATE 50 UG/ML
INJECTION, SOLUTION INTRAMUSCULAR; INTRAVENOUS
Status: DISPENSED
Start: 2018-08-29

## (undated) RX ORDER — FENTANYL CITRATE 50 UG/ML
INJECTION, SOLUTION INTRAMUSCULAR; INTRAVENOUS
Status: DISPENSED
Start: 2018-12-26

## (undated) RX ORDER — HYDROMORPHONE HYDROCHLORIDE 1 MG/ML
INJECTION, SOLUTION INTRAMUSCULAR; INTRAVENOUS; SUBCUTANEOUS
Status: DISPENSED
Start: 2018-12-26

## (undated) RX ORDER — GINSENG 100 MG
CAPSULE ORAL
Status: DISPENSED
Start: 2018-08-29

## (undated) RX ORDER — CEFAZOLIN SODIUM 2 G/100ML
INJECTION, SOLUTION INTRAVENOUS
Status: DISPENSED
Start: 2018-12-26

## (undated) RX ORDER — VECURONIUM BROMIDE 1 MG/ML
INJECTION, POWDER, LYOPHILIZED, FOR SOLUTION INTRAVENOUS
Status: DISPENSED
Start: 2018-12-26

## (undated) RX ORDER — DEXAMETHASONE SODIUM PHOSPHATE 4 MG/ML
INJECTION, SOLUTION INTRA-ARTICULAR; INTRALESIONAL; INTRAMUSCULAR; INTRAVENOUS; SOFT TISSUE
Status: DISPENSED
Start: 2018-08-29

## (undated) RX ORDER — ONDANSETRON 2 MG/ML
INJECTION INTRAMUSCULAR; INTRAVENOUS
Status: DISPENSED
Start: 2018-08-29

## (undated) RX ORDER — CEFAZOLIN SODIUM 2 G/100ML
INJECTION, SOLUTION INTRAVENOUS
Status: DISPENSED
Start: 2018-08-29

## (undated) RX ORDER — PHENYLEPHRINE HCL IN 0.9% NACL 1 MG/10 ML
SYRINGE (ML) INTRAVENOUS
Status: DISPENSED
Start: 2018-08-29

## (undated) RX ORDER — KETAMINE HYDROCHLORIDE 10 MG/ML
INJECTION, SOLUTION INTRAMUSCULAR; INTRAVENOUS
Status: DISPENSED
Start: 2018-12-26

## (undated) RX ORDER — LIDOCAINE HYDROCHLORIDE 20 MG/ML
INJECTION, SOLUTION EPIDURAL; INFILTRATION; INTRACAUDAL; PERINEURAL
Status: DISPENSED
Start: 2018-12-26

## (undated) RX ORDER — OXYCODONE HYDROCHLORIDE 5 MG/1
TABLET ORAL
Status: DISPENSED
Start: 2018-12-26

## (undated) RX ORDER — LIDOCAINE HYDROCHLORIDE 10 MG/ML
INJECTION, SOLUTION EPIDURAL; INFILTRATION; INTRACAUDAL; PERINEURAL
Status: DISPENSED
Start: 2018-08-29

## (undated) RX ORDER — ONDANSETRON 2 MG/ML
INJECTION INTRAMUSCULAR; INTRAVENOUS
Status: DISPENSED
Start: 2018-12-26